# Patient Record
Sex: MALE | Race: WHITE | NOT HISPANIC OR LATINO | Employment: OTHER | URBAN - METROPOLITAN AREA
[De-identification: names, ages, dates, MRNs, and addresses within clinical notes are randomized per-mention and may not be internally consistent; named-entity substitution may affect disease eponyms.]

---

## 2022-07-15 ENCOUNTER — TELEPHONE (OUTPATIENT)
Dept: RADIATION ONCOLOGY | Facility: HOSPITAL | Age: 87
End: 2022-07-15

## 2022-07-18 ENCOUNTER — OFFICE VISIT (OUTPATIENT)
Dept: RADIATION ONCOLOGY | Facility: HOSPITAL | Age: 87
End: 2022-07-18

## 2022-07-18 ENCOUNTER — HOSPITAL ENCOUNTER (OUTPATIENT)
Dept: RADIATION ONCOLOGY | Facility: HOSPITAL | Age: 87
Setting detail: RADIATION/ONCOLOGY SERIES
Discharge: HOME | End: 2022-07-18

## 2022-07-18 VITALS
TEMPERATURE: 98.3 F | DIASTOLIC BLOOD PRESSURE: 74 MMHG | OXYGEN SATURATION: 98 % | RESPIRATION RATE: 16 BRPM | BODY MASS INDEX: 27.62 KG/M2 | HEIGHT: 71 IN | WEIGHT: 197.3 LBS | HEART RATE: 50 BPM | SYSTOLIC BLOOD PRESSURE: 175 MMHG

## 2022-07-18 DIAGNOSIS — C61 PROSTATE CANCER: Primary | ICD-10-CM

## 2022-07-18 PROCEDURE — G0463 HOSPITAL OUTPT CLINIC VISIT: HCPCS

## 2022-07-18 RX ORDER — BIMATOPROST 0.01 %
DROPS OPHTHALMIC (EYE)
COMMUNITY
Start: 2022-07-18 | End: 2022-08-26 | Stop reason: SDUPTHER

## 2022-07-18 RX ORDER — TAMSULOSIN HYDROCHLORIDE 0.4 MG/1
CAPSULE ORAL
COMMUNITY

## 2022-07-18 RX ORDER — BRIMONIDINE TARTRATE 0.15 %
DROPS OPHTHALMIC (EYE)
COMMUNITY
Start: 2022-07-18 | End: 2022-08-26 | Stop reason: SDUPTHER

## 2022-07-18 RX ORDER — NAPROXEN SODIUM 220 MG
220 TABLET ORAL 2 TIMES DAILY PRN
COMMUNITY

## 2022-07-18 RX ORDER — FERROUS SULFATE TAB EC 324 MG (65 MG FE EQUIVALENT) 324 (65 FE) MG
324 TABLET DELAYED RESPONSE ORAL
COMMUNITY

## 2022-07-18 RX ORDER — DORZOLAMIDE HYDROCHLORIDE AND TIMOLOL MALEATE 20; 5 MG/ML; MG/ML
SOLUTION/ DROPS OPHTHALMIC
COMMUNITY
End: 2022-07-18 | Stop reason: SDUPTHER

## 2022-07-18 RX ORDER — DUTASTERIDE 0.5 MG/1
CAPSULE, LIQUID FILLED ORAL
COMMUNITY

## 2022-07-18 RX ORDER — CYCLOSPORINE 0.5 MG/ML
EMULSION OPHTHALMIC
COMMUNITY
Start: 2022-04-19

## 2022-07-18 RX ORDER — MELOXICAM 7.5 MG/1
TABLET ORAL
COMMUNITY

## 2022-07-18 RX ORDER — HYDROCHLOROTHIAZIDE 25 MG/1
TABLET ORAL
COMMUNITY

## 2022-07-18 RX ORDER — LEVOTHYROXINE SODIUM 0.15 MG/1
TABLET ORAL
COMMUNITY
End: 2022-11-09

## 2022-07-18 RX ORDER — OMEPRAZOLE 20 MG/1
20 CAPSULE, DELAYED RELEASE ORAL DAILY
COMMUNITY

## 2022-07-18 RX ORDER — TRIAMCINOLONE ACETONIDE 1 MG/G
CREAM TOPICAL
COMMUNITY

## 2022-07-18 RX ORDER — DIPHENOXYLATE HYDROCHLORIDE AND ATROPINE SULFATE 2.5; .025 MG/1; MG/1
TABLET ORAL
COMMUNITY

## 2022-07-18 NOTE — PROGRESS NOTES
CONSULTATION NOTE      :                                                          1933  DATE OF CONSULTATION:                       2022   REQUESTING PHYSICIAN:                   Archie Rob Jr*  REASON FOR CONSULTATION:           Prostate cancer (HCC)  - Stage IIIA (cT1c, cN0, cM0, PSA: 21.1, Grade Group: 4)       BRIEF HISTORY:  The patient is a very pleasant 89 y.o. male  with recently diagnosed prostate cancer.  He has had a long history of BPH and elevated PSA values.  He is on Flomax and Avodart.  He had a negative prostate biopsy in .  MRI 2020 showed BPH without suspicious mass.   PSA reached a maximum value of 21.1 ng/ml on 2022.  Repeat MRI 2022 identified a PI-RADS 5 lesion in the left mid transitional zone.  This was contained within the prostate gland.  There was no evidence of pathologic adenopathy or disease elsewhere in the pelvis.  Prostate measured 70 cc on imaging.  Biopsy 2022 revealed presence of prostatic adenocarcinoma, Karri's 4+4 = 8, 1 of 16 core samples taken from the left mid gland.  Tumor involves 20% of submitted tissue.  Nuclear medicine bone scan showed no evidence of bone metastasis.  Patient is fairly healthy and active for his age.  He initiated definitive treatment with LHRH agonist injection.  He was referred for radiotherapy.    Allergy: No Known Allergies    Social History:   Social History     Socioeconomic History   • Marital status:    Tobacco Use   • Smoking status: Former Smoker     Types: Cigarettes     Quit date: 1964     Years since quittin.5   • Smokeless tobacco: Never Used   Vaping Use   • Vaping Use: Never used   Substance and Sexual Activity   • Alcohol use: Yes     Comment: 5 per week wine or bourbon   • Drug use: Never   • Sexual activity: Defer       Past Medical History:   Past Medical History:   Diagnosis Date   • Diabetes mellitus (HCC)    • Disease of thyroid gland    • Glaucoma    • Prostate  cancer (HCC)        Family History: family history includes Cancer in his sister; Lung cancer in his mother; Pancreatitis in his mother; Prader-Willi syndrome in his sister.     Surgical History:   Past Surgical History:   Procedure Laterality Date   • CATARACT EXTRACTION     • CHOLECYSTECTOMY     • COLONOSCOPY      last one at 82   • PARATHYROID GLAND SURGERY     • PROSTATE BIOPSY     • THYROID SURGERY          Review of Systems:   Review of Systems   Musculoskeletal: Positive for gait problem.   Neurological: Positive for gait problem.             IPSS Questionnaire (AUA-7):  Over the past month…    1)  Incomplete Emptying  How often have you had a sensation of not emptying your bladder?  1 - Less than 1 time in 5   2)  Frequency  How often have you had to urinate less than every two hours? 1 - Less than 1 time in 5   3)  Intermittency  How often have you found you stopped and started again several times when you urinated?  2 - Less than half the time   4) Urgency  How often have you found it difficult to postpone urination?  0 - Not at all   5) Weak Stream  How often have you had a weak urinary stream?  3 - About half the time   6) Straining  How often have you had to push or strain to begin urination?  1 - Less than 1 time in 5   7) Nocturia  How many times did you typically get up at night to urinate?  2 - 2 times   Total Score:  10       Quality of life due to urinary symptoms:  If you were to spend the rest of your life with your urinary condition the way it is now, how would you feel about that? 1-Pleased   Urine Leakage (Incontinence) 0-No Leakage     Sexual Health Inventory  Current Status    1)  How do you rate your confidence that you could achieve and keep an erection? 1-Very Low   2) When you had erections with sexual stimulation, how often were your erections hard enough for penetration (entering your partner)? 0-No sexual activity   3)  During sexual intercourse, how often were you able to maintain  "your erection after you had penetrated (entered) into your partner? 0-Did not attempt intercourse   4) During sexual intercourse, how difficult was it to maintain your erection to completion of intercourse? 0-Did not attempt intercourse   5) When you attempted sexual intercourse, how often was it satisfactory to you? 0-No sexual activity   Total Score: 1       Bowel Health Inventory  Current Status: 0-No problems, no rectal bleeding, no discharge, less then 5 bowel movements a day                 Objective   VITAL SIGNS:   Vitals:    07/18/22 1310   BP: 175/74   Pulse: 50   Resp: 16   Temp: 98.3 °F (36.8 °C)   SpO2: 98%  Comment: RA   Weight: 89.5 kg (197 lb 4.8 oz)   Height: 180.3 cm (71\")   PainSc: 0-No pain        Karnofsky score: 80   KSP %:  80    Physical Exam:   Physical Exam  Vitals and nursing note reviewed.   Constitutional:       Appearance: He is well-developed.   HENT:      Head: Normocephalic and atraumatic.   Cardiovascular:      Rate and Rhythm: Normal rate and regular rhythm.      Heart sounds: Normal heart sounds. No murmur heard.  Pulmonary:      Effort: Pulmonary effort is normal.      Breath sounds: Normal breath sounds. No wheezing or rales.   Chest:   Breasts:      Right: No supraclavicular adenopathy.      Left: No supraclavicular adenopathy.       Abdominal:      General: Bowel sounds are normal. There is no distension.      Palpations: Abdomen is soft.      Tenderness: There is no abdominal tenderness.   Genitourinary:     Prostate: Enlarged ( 60 to 80 cc, symmetrical infarct, smooth area of induration left mid gland without palpable raised nodularity.  Seminal vesicles are nonpalpable.). Not tender and no nodules present.      Rectum: No mass, tenderness, external hemorrhoid or internal hemorrhoid. Normal anal tone.   Musculoskeletal:         General: No tenderness. Normal range of motion.      Cervical back: Normal range of motion and neck supple.   Lymphadenopathy:      Cervical: No " cervical adenopathy.      Upper Body:      Right upper body: No supraclavicular adenopathy.      Left upper body: No supraclavicular adenopathy.   Skin:     General: Skin is warm and dry.   Neurological:      Mental Status: He is alert and oriented to person, place, and time.      Sensory: No sensory deficit.   Psychiatric:         Behavior: Behavior normal.         Thought Content: Thought content normal.         Judgment: Judgment normal.              The following portions of the patient's history were reviewed and updated as appropriate: allergies, current medications, past family history, past medical history, past social history, past surgical history and problem list.    Assessment:   Assessment      Prostate cancer, Karri's 4+4 = 8, clinical stage IIIA (T1c, N0, M0), PSA 21.1 ng/ml.  Clinical localized, higher grade tumor but low volume disease confined to the prostate on MRI.  He has already initiated androgen ablation.  He is tolerating that very well.  He is a candidate for definitive radiotherapy, which should give a very good chance of disease control in the pelvis.  We reviewed the use intensity modulated radiotherapy versus stereotactic body radiotherapy.  Patient would like to proceed with stereotactic body radiotherapy.  CyberKnife treatment procedure has been reviewed in detail.  Informed consent was obtained.    RECOMMENDATIONS: He will return to Dr. Rob for placement of gold seed fiducials.  He will subsequently return here for treatment planning CT and MRI pelvis.  The prostate gland will receive 5 fractions of 7 Gy, delivered on the CyberKnife on an every other day treatment schedule.    I spent a total of 55 minutes on todays visit, with more than 45 minutes in direct face to face communication, and the remainder of the time spent in reviewing the relevant history, records, available imaging, and for documentation.    Follow Up:   Return in about 4 weeks (around 8/15/2022) for Office  Visit, Simulation.  Diagnoses and all orders for this visit:    1. Prostate cancer (HCC) (Primary)         Jovan Cortez MD

## 2022-07-26 ENCOUNTER — TELEPHONE (OUTPATIENT)
Dept: RADIATION ONCOLOGY | Facility: HOSPITAL | Age: 87
End: 2022-07-26

## 2022-07-27 NOTE — TELEPHONE ENCOUNTER
Pt requested to talk to Elizabeth when she returns from her vacation about treatments in August or September but stated there was no johnson to call back. Elizabeth made aware to call Monday 8/1/22

## 2022-08-04 DIAGNOSIS — C61 PROSTATE CANCER: Primary | ICD-10-CM

## 2022-08-05 DIAGNOSIS — C61 PROSTATE CANCER: Primary | ICD-10-CM

## 2022-08-18 ENCOUNTER — DOCUMENTATION (OUTPATIENT)
Dept: NUTRITION | Facility: HOSPITAL | Age: 87
End: 2022-08-18

## 2022-08-26 ENCOUNTER — HOSPITAL ENCOUNTER (OUTPATIENT)
Dept: MRI IMAGING | Facility: HOSPITAL | Age: 87
Discharge: HOME | End: 2022-08-26

## 2022-08-26 ENCOUNTER — HOSPITAL ENCOUNTER (OUTPATIENT)
Dept: RADIATION ONCOLOGY | Facility: HOSPITAL | Age: 87
Setting detail: RADIATION/ONCOLOGY SERIES
Discharge: HOME | End: 2022-08-26

## 2022-08-26 ENCOUNTER — HOSPITAL ENCOUNTER (OUTPATIENT)
Dept: RADIATION ONCOLOGY | Facility: HOSPITAL | Age: 87
Discharge: HOME | End: 2022-08-26

## 2022-08-26 ENCOUNTER — OFFICE VISIT (OUTPATIENT)
Dept: RADIATION ONCOLOGY | Facility: HOSPITAL | Age: 87
End: 2022-08-26

## 2022-08-26 VITALS
BODY MASS INDEX: 26.88 KG/M2 | OXYGEN SATURATION: 96 % | WEIGHT: 192 LBS | HEART RATE: 64 BPM | RESPIRATION RATE: 16 BRPM | TEMPERATURE: 97.4 F | HEIGHT: 71 IN | DIASTOLIC BLOOD PRESSURE: 71 MMHG | SYSTOLIC BLOOD PRESSURE: 163 MMHG

## 2022-08-26 DIAGNOSIS — C61 PROSTATE CANCER: ICD-10-CM

## 2022-08-26 DIAGNOSIS — C61 PROSTATE CANCER: Primary | ICD-10-CM

## 2022-08-26 PROCEDURE — G0463 HOSPITAL OUTPT CLINIC VISIT: HCPCS

## 2022-08-26 PROCEDURE — 72195 MRI PELVIS W/O DYE: CPT

## 2022-08-26 PROCEDURE — 77399 UNLISTED PX MED RADJ PHYSICS: CPT | Performed by: RADIOLOGY

## 2022-08-26 RX ORDER — BRIMONIDINE TARTRATE 0.15 %
1 DROPS OPHTHALMIC (EYE) 3 TIMES DAILY
COMMUNITY
Start: 2022-07-27

## 2022-08-26 RX ORDER — FERROUS SULFATE 324(65)MG
324 TABLET, DELAYED RELEASE (ENTERIC COATED) ORAL DAILY
COMMUNITY

## 2022-08-26 NOTE — PROGRESS NOTES
RE-EVALUATION    PATIENT:                                                      Juancho Smith Jr.  :                                                          1933  DATE:                          2022   DIAGNOSIS:     Prostate cancer (HCC)  - Stage IIIA (cT1c, cN0, cM0, PSA: 21.1, Grade Group: 4)         BRIEF HISTORY:  The patient is a very pleasant 89 y.o. male  with clinically localized, higher risk prostate cancer.  He initiated androgen ablation and is tolerating that well.  He has recently received his second LHRH agonist injection.  He decided to pursue definitive treatment with stereotactic body radiotherapy.  He tolerated placement of gold seed fiducials with minimal difficulty.  Brief transient hematuria has cleared.  Brief fatigue has also resolved.  He has had no other change in health since informed consent was obtained on 2022.  He remains a good candidate for SBRT.  He is here today for simulation.  He has been following all recommended prep and restrictions.    No Known Allergies    Review of Systems   Constitutional: Positive for fatigue.   HENT:   Positive for hearing loss.                   IPSS Questionnaire (AUA-7):  Over the past month…     1)  Incomplete Emptying  How often have you had a sensation of not emptying your bladder?  1 - Less than 1 time in 5   2)  Frequency  How often have you had to urinate less than every two hours? 1 - Less than 1 time in 5   3)  Intermittency  How often have you found you stopped and started again several times when you urinated?  2 - Less than half the time   4) Urgency  How often have you found it difficult to postpone urination?  0 - Not at all   5) Weak Stream  How often have you had a weak urinary stream?  3 - About half the time   6) Straining  How often have you had to push or strain to begin urination?  1 - Less than 1 time in 5   7) Nocturia  How many times did you typically get up at night to urinate?  2 - 2 times   Total Score:  10      "    Quality of life due to urinary symptoms:  If you were to spend the rest of your life with your urinary condition the way it is now, how would you feel about that? 1-Pleased   Urine Leakage (Incontinence) 0-No Leakage      Sexual Health Inventory  Current Status     1)  How do you rate your confidence that you could achieve and keep an erection? 1-Very Low   2) When you had erections with sexual stimulation, how often were your erections hard enough for penetration (entering your partner)? 0-No sexual activity   3)  During sexual intercourse, how often were you able to maintain your erection after you had penetrated (entered) into your partner? 0-Did not attempt intercourse   4) During sexual intercourse, how difficult was it to maintain your erection to completion of intercourse? 0-Did not attempt intercourse   5) When you attempted sexual intercourse, how often was it satisfactory to you? 0-No sexual activity   Total Score: 1         Bowel Health Inventory  Current Status: 0-No problems, no rectal bleeding, no discharge, less then 5 bowel movements a day               Objective   VITAL SIGNS:   Vitals:    08/26/22 1030   BP: 163/71   Pulse: 64   Resp: 16   Temp: 97.4 °F (36.3 °C)   SpO2: 96%  Comment: RA   Weight: 87.1 kg (192 lb)   Height: 180.3 cm (71\")   PainSc: 0-No pain                Physical Exam  Vitals and nursing note reviewed.   Constitutional:       Appearance: He is well-developed.   HENT:      Head: Normocephalic and atraumatic.   Cardiovascular:      Rate and Rhythm: Normal rate and regular rhythm.      Heart sounds: No murmur heard.  Pulmonary:      Effort: Pulmonary effort is normal.      Breath sounds: No wheezing or rales.   Abdominal:      General: There is no distension.      Palpations: Abdomen is soft.      Tenderness: There is no abdominal tenderness.   Musculoskeletal:         General: No tenderness. Normal range of motion.      Cervical back: Normal range of motion and neck supple. "   Skin:     General: Skin is warm and dry.   Neurological:      Mental Status: He is alert and oriented to person, place, and time.      Sensory: No sensory deficit.   Psychiatric:         Behavior: Behavior normal.         Thought Content: Thought content normal.         Judgment: Judgment normal.              The following portions of the patient's history were reviewed and updated as appropriate: allergies, current medications, past family history, past medical history, past social history, past surgical history and problem list.      IMPRESSION:  Prostate cancer, [place]'s 4+4 = 8, clinical stage IIIA (T1c, N0, M0), PSA 21.1 ng/ml.      RECOMMENDATIONS: He will undergo treatment planning CT and MRI pelvis today.  The prostate gland and proximal seminal vesicles will receive 5 fractions of 7 Gray each, delivered on CyberKnife, on every other day treatment schedule.       Jovan Cortez MD     Approximate 15 minutes was spent during this visit, 10 minutes with patient and wife reviewing procedure and answering questions.

## 2022-09-01 ENCOUNTER — HOSPITAL ENCOUNTER (OUTPATIENT)
Dept: RADIATION ONCOLOGY | Facility: HOSPITAL | Age: 87
Setting detail: RADIATION/ONCOLOGY SERIES
Discharge: HOME | End: 2022-09-01

## 2022-09-02 ENCOUNTER — DOCUMENTATION (OUTPATIENT)
Dept: RADIATION ONCOLOGY | Facility: HOSPITAL | Age: 87
End: 2022-09-02

## 2022-09-02 ENCOUNTER — TELEPHONE (OUTPATIENT)
Dept: RADIATION ONCOLOGY | Facility: HOSPITAL | Age: 87
End: 2022-09-02

## 2022-09-02 NOTE — TELEPHONE ENCOUNTER
Pt called to report that he had a recent fall which has made his left knee pain worse.  He states he doesn't think he can lie on the table with his knees extended and wanted to know if it would be possible to have a roll under his left knee.  I explained it would likely be 2 more weeks before his treatment starts but if his knee is still hurting we would do our best to make him comfortable and put a small roll under his knee.  He also wanted to report that he is now taking meloxicam for the knee pain.

## 2022-09-09 PROCEDURE — 77300 RADIATION THERAPY DOSE PLAN: CPT | Performed by: RADIOLOGY

## 2022-09-09 PROCEDURE — 77338 DESIGN MLC DEVICE FOR IMRT: CPT | Performed by: RADIOLOGY

## 2022-09-09 PROCEDURE — 77301 RADIOTHERAPY DOSE PLAN IMRT: CPT | Performed by: RADIOLOGY

## 2022-09-20 ENCOUNTER — HOSPITAL ENCOUNTER (OUTPATIENT)
Dept: RADIATION ONCOLOGY | Facility: HOSPITAL | Age: 87
Discharge: HOME | End: 2022-09-20

## 2022-09-20 PROCEDURE — 77373 STRTCTC BDY RAD THER TX DLVR: CPT | Performed by: RADIOLOGY

## 2022-09-23 ENCOUNTER — HOSPITAL ENCOUNTER (OUTPATIENT)
Dept: RADIATION ONCOLOGY | Facility: HOSPITAL | Age: 87
Discharge: HOME | End: 2022-09-23

## 2022-09-23 PROCEDURE — 77373 STRTCTC BDY RAD THER TX DLVR: CPT | Performed by: RADIOLOGY

## 2022-09-26 ENCOUNTER — HOSPITAL ENCOUNTER (OUTPATIENT)
Dept: RADIATION ONCOLOGY | Facility: HOSPITAL | Age: 87
Discharge: HOME | End: 2022-09-26

## 2022-09-26 PROCEDURE — 77373 STRTCTC BDY RAD THER TX DLVR: CPT | Performed by: RADIOLOGY

## 2022-09-26 PROCEDURE — 77280 THER RAD SIMULAJ FIELD SMPL: CPT | Performed by: RADIOLOGY

## 2022-09-28 ENCOUNTER — HOSPITAL ENCOUNTER (OUTPATIENT)
Dept: RADIATION ONCOLOGY | Facility: HOSPITAL | Age: 87
Discharge: HOME | End: 2022-09-28

## 2022-09-28 PROCEDURE — 77373 STRTCTC BDY RAD THER TX DLVR: CPT | Performed by: RADIOLOGY

## 2022-09-30 ENCOUNTER — HOSPITAL ENCOUNTER (OUTPATIENT)
Dept: RADIATION ONCOLOGY | Facility: HOSPITAL | Age: 87
Discharge: HOME | End: 2022-09-30

## 2022-09-30 PROCEDURE — 77373 STRTCTC BDY RAD THER TX DLVR: CPT | Performed by: RADIOLOGY

## 2022-09-30 PROCEDURE — 77336 RADIATION PHYSICS CONSULT: CPT | Performed by: RADIOLOGY

## 2022-11-09 ENCOUNTER — HOSPITAL ENCOUNTER (OUTPATIENT)
Dept: RADIATION ONCOLOGY | Facility: HOSPITAL | Age: 87
Setting detail: RADIATION/ONCOLOGY SERIES
Discharge: HOME | End: 2022-11-09

## 2022-11-09 ENCOUNTER — OFFICE VISIT (OUTPATIENT)
Dept: RADIATION ONCOLOGY | Facility: HOSPITAL | Age: 87
End: 2022-11-09

## 2022-11-09 DIAGNOSIS — C61 PROSTATE CANCER: Primary | ICD-10-CM

## 2022-11-09 RX ORDER — LEVOTHYROXINE SODIUM 137 UG/1
137 TABLET ORAL DAILY
COMMUNITY

## 2022-11-09 NOTE — PROGRESS NOTES
TELEMEDICINE FOLLOW UP NOTE    PATIENT:                                                      Juancho Smith Jr.  MEDICAL RECORD #:                        [id number]  :                                                          1933  COMPLETION DATE:   2022  DIAGNOSIS:     Prostate cancer (HCC)  - Stage IIIA (cT1c, cN0, cM0, PSA: 21.1, Grade Group: 4)    This visit has been converted to a telehealth virtual visit.  Total time of discussion was 28 minutes.  The patient has given verbal consent.      COVID-19 RISK SCREEN      1. Has the patient had close contact or exposure without PPE with a lab confirmed COVID-19 (+) person or a person under investigation (PUI) for COVID-19 infection?  -- No      2. Has the patient had respiratory symptoms, worsened/new cough and/or SOA, unexplained fever, or sudden loss of smell and/or taste in the past week? --  No     3. Has the patient completed COVID vaccination? --  Yes     BRIEF HISTORY:    Initial follow up visit for clinically localized, higher risk prostate cancer.  He has initiated neoadjuvant and concurrent androgen ablation therapy and is tolerating that well.  The patient has since undergone definitive treatment with a course of stereotactic body radiotherapy.  He tolerated treatment well.  He reports post-treatment fatigue has subsided.  He continues longstanding use of Flomax and finasteride in the setting of BPH with chronic, moderate urinary outflow obstructive symptoms.  He reports urinary frequency and urgency were mildly exacerbated and have largely returned to normal at this point.   He describes a brief duration of some discomfort without burning with initiation of stream.  This has reportedly resolved.  No hematuria or incontinence.  He experienced single episode of loose stool and no other GI symptoms.  Overall, he feels well.  He has gained a few pounds which he relates to hormone therapy.       IPSS Questionnaire (AUA-7):  Over the past month…    1)   Incomplete Emptying  How often have you had a sensation of not emptying your bladder?  1 - Less than 1 time in 5   2)  Frequency  How often have you had to urinate less than every two hours? 1 - Less than 1 time in 5   3)  Intermittency  How often have you found you stopped and started again several times when you urinated?  2 - Less than half the time   4) Urgency  How often have you found it difficult to postpone urination?  0 - Not at all   5) Weak Stream  How often have you had a weak urinary stream?  3 - About half the time   6) Straining  How often have you had to push or strain to begin urination?  1 - Less than 1 time in 5   7) Nocturia  How many times did you typically get up at night to urinate?  3 - 3 times   Total Score:  11       Quality of life due to urinary symptoms:  If you were to spend the rest of your life with your urinary condition the way it is now, how would you feel about that? 2-Mostly Satisfied   Urine Leakage (Incontinence) 0-No Leakage     Sexual Health Inventory  Current Status    1)  How do you rate your confidence that you could achieve and keep an erection? 1-Very Low   2) When you had erections with sexual stimulation, how often were your erections hard enough for penetration (entering your partner)? 0-No sexual activity   3)  During sexual intercourse, how often were you able to maintain your erection after you had penetrated (entered) into your partner? 0-Did not attempt intercourse   4) During sexual intercourse, how difficult was it to maintain your erection to completion of intercourse? 0-Did not attempt intercourse   5) When you attempted sexual intercourse, how often was it satisfactory to you? 0-No sexual activity   Total Score: 1       Bowel Health Inventory  Current Status: 0-No problems, no rectal bleeding, no discharge, less then 5 bowel movements a day         MEDICATIONS: Medication reconciliation for the patient was reviewed and confirmed in the electronic medical  record.    Review of Systems   Constitutional: Positive for fatigue.   HENT:   Positive for hearing loss.    All other systems reviewed and are negative.          KPS 80%      Physical Exam  Pulmonary:      Respirations even, unlabored. No audible wheezing or cough.  Neurological:      A+Ox4, conversant, answers questions appropriately.  Psychiatric:     Judgement, affect, and decision-making WNL.    Limited physical exam as visit was conducted remotely via telephone in light of the COVID-19 pandemic.      The following portions of the patient's history were reviewed and updated as appropriate: allergies, current medications, past family history, past medical history, past social history, past surgical history and problem list.         Diagnoses and all orders for this visit:    1. Prostate cancer (HCC) (Primary)         IMPRESSION:  Prostate cancer, Karri's 4+4 = 8, clinical stage IIIA (T1c, N0, M0), PSA 21.1 ng/ml.  Clinically localized, higher grade tumor but low volume disease confined to the prostate on MRI.   He initiated androgen ablation with next 6-month LHRH agonist injection scheduled February 2023.  He is now 1 month status post CyberKnife SBRT.  He tolerated treatment well.   He developed the anticipated grade 1 fatigue and urinary side effects which appropriately have subsided.  He did not develop significant acute radiation related toxicities otherwise.  He will continue longstanding use of flomax and finasteride at least for now, in the setting of chronic BPH symptoms and grossly enlarged prostate.  We discussed that we would expect clinical downsizing of the gland related to hormone therapy and SBRT.    Mr. Smith and I have reviewed the survivorship care plan in detail.  We discussed diagnosis, follow-up intervals, biannual PSA monitoring, and expectations for response to treatment.  A copy of the care plan has been mailed to the patient.  A copy has also been sent to his PCP.           RECOMMENDATIONS:  Mr. Smith continues routine urologic surveillance under the direction of Dr. Rob, including management of ADT and biannual PSA monitoring.     Return in about 6 months (around 5/9/2023) for Office Visit.    ALBIN Ledesma    I spent a total of 55 minutes on today's visit, with more than 28 minutes in virtual communication with the patient via telephone, and the remainder of the time spent in reviewing the relevant history, records, available imaging, and for documentation.

## 2023-05-10 ENCOUNTER — OFFICE VISIT (OUTPATIENT)
Dept: RADIATION ONCOLOGY | Facility: HOSPITAL | Age: 88
End: 2023-05-10
Payer: MEDICARE

## 2023-05-10 ENCOUNTER — HOSPITAL ENCOUNTER (OUTPATIENT)
Dept: RADIATION ONCOLOGY | Facility: HOSPITAL | Age: 88
Setting detail: RADIATION/ONCOLOGY SERIES
Discharge: HOME | End: 2023-05-10
Payer: MEDICARE

## 2023-05-10 VITALS
RESPIRATION RATE: 16 BRPM | OXYGEN SATURATION: 95 % | BODY MASS INDEX: 28.17 KG/M2 | SYSTOLIC BLOOD PRESSURE: 148 MMHG | WEIGHT: 202 LBS | DIASTOLIC BLOOD PRESSURE: 66 MMHG | HEART RATE: 60 BPM | TEMPERATURE: 97.2 F

## 2023-05-10 DIAGNOSIS — C61 PROSTATE CANCER: Primary | ICD-10-CM

## 2023-05-10 PROCEDURE — G0463 HOSPITAL OUTPT CLINIC VISIT: HCPCS

## 2023-05-10 NOTE — PROGRESS NOTES
FOLLOW UP NOTE    PATIENT:                                                      Juancho Smith Jr.  MEDICAL RECORD #:                        [id number]  :                                                          1933  COMPLETION DATE:   2022  DIAGNOSIS:     Prostate cancer  - Stage IIIA (cT1c, cN0, cM0, PSA: 21.1, Grade Group: 4)      BRIEF HISTORY:    Routine follow up visit for clinically localized, higher risk prostate cancer.  He initiated neoadjuvant and concurrent androgen ablation therapy with LHRH agonist injection in 2022.  The patient then underwent definitive treatment with a course of CyberKnife stereotactic body radiotherapy, completing in 2023.  He tolerated treatment well.  He continues longstanding use of Flomax and dutasteride in the setting of BPH related urinary outflow obstructive symptoms.  He reports urinary voiding is stable and unchanged from prior.  He denies hematuria, dysuria, or incontinence.  He reports regular bowel movements and denies acute GI complaints.  Energy level is stable.  He remains active and independent with ADLs.  He suffered a recent right wrist fracture secondary to tripping and falling over tree roots.  He otherwise denies new or progressive bone pains.  He reports good tolerance of hormonal therapy.  Last Eligard injection was administered in 2023.  Post-treatment PSA continues to decline, with value of 0.09 ng/ml on 11/10/2023 and 0.06 ng/ml on 2023.        IPSS Questionnaire (AUA-7):  Over the past month…    1)  Incomplete Emptying  How often have you had a sensation of not emptying your bladder?  2 - Less than half the time   2)  Frequency  How often have you had to urinate less than every two hours? 1 - Less than 1 time in 5   3)  Intermittency  How often have you found you stopped and started again several times when you urinated?  0 - Not at all   4) Urgency  How often have you found it difficult to postpone urination?  1 - Less than 1 time in  5   5) Weak Stream  How often have you had a weak urinary stream?  1 - Less than 1 time in 5   6) Straining  How often have you had to push or strain to begin urination?  0 - Not at all   7) Nocturia  How many times did you typically get up at night to urinate?  2 - 2 times   Total Score:  7       Quality of life due to urinary symptoms:  If you were to spend the rest of your life with your urinary condition the way it is now, how would you feel about that? 1-Pleased   Urine Leakage (Incontinence) 0-No Leakage     Sexual Health Inventory  Current Status    1)  How do you rate your confidence that you could achieve and keep an erection? 1-Very Low   2) When you had erections with sexual stimulation, how often were your erections hard enough for penetration (entering your partner)? 1-Almost never or never   3)  During sexual intercourse, how often were you able to maintain your erection after you had penetrated (entered) into your partner? 1-Almost never or never   4) During sexual intercourse, how difficult was it to maintain your erection to completion of intercourse? 1-Extremely difficult   5) When you attempted sexual intercourse, how often was it satisfactory to you? 1-Almost never or never   Total Score: 5       Bowel Health Inventory  Current Status: 0-No problems, no rectal bleeding, no discharge, less then 5 bowel movements a day         MEDICATIONS: Medication reconciliation for the patient was reviewed and confirmed in the electronic medical record.    Review of Systems   HENT:   Positive for hearing loss.    Genitourinary: Positive for nocturia.    All other systems reviewed and are negative.          KPS 80%      Physical Exam  Vitals and nursing note reviewed.   Constitutional:       General: He is not in acute distress.     Appearance: Normal appearance. He is well-developed.   HENT:      Head: Normocephalic and atraumatic.   Eyes:      Conjunctiva/sclera: Conjunctivae normal.      Pupils: Pupils are  equal, round, and reactive to light.   Cardiovascular:      Rate and Rhythm: Normal rate and regular rhythm.   Pulmonary:      Effort: Pulmonary effort is normal.      Breath sounds: Normal breath sounds.   Genitourinary:     Prostate: Enlarged (30-40 cc (previously 60-80 cc), flat, smooth, no nodules or induration.  Seminal vesicles not palpable.). Not tender and no nodules present.      Rectum: No mass, external hemorrhoid or internal hemorrhoid. Normal anal tone.   Musculoskeletal:         General: Signs of injury (right wrist in hard cast 2/2 fracture) present. Normal range of motion.      Cervical back: Normal range of motion and neck supple.   Lymphadenopathy:      Cervical: No cervical adenopathy.   Skin:     General: Skin is warm and dry.   Neurological:      Mental Status: He is alert and oriented to person, place, and time.   Psychiatric:         Behavior: Behavior normal.         Thought Content: Thought content normal.         Judgment: Judgment normal.         VITAL SIGNS:   Vitals:    05/10/23 1611   BP: 148/66   Pulse: 60   Resp: 16   Temp: 97.2 °F (36.2 °C)   TempSrc: Temporal   SpO2: 95%   Weight: 91.6 kg (202 lb)                 The following portions of the patient's history were reviewed and updated as appropriate: allergies, current medications, past family history, past medical history, past social history, past surgical history and problem list.         Diagnoses and all orders for this visit:    1. Prostate cancer (Primary)         IMPRESSION:  Prostate cancer, Karri's 4+4 = 8, clinical stage IIIA (T1c, N0, M0), PSA 21.1 ng/ml.  Clinically localized, higher grade tumor but low volume disease confined to the prostate on MRI.   7 months status post CyberKnife SBRT which he received concurrently with androgen ablation.  He continues adjuvant androgen ablation planned for a total duration of 2 years.    The patient tolerated treatment well.  He has had excellent clinical and biochemical  response to treatment with downsizing of his grossly enlarged prostate, and PSA reduction down to a meaghan value of 0.06 ng/ml.  Prognosis should remain good at this point.  He will continue longstanding use of flomax and finasteride at least for now, but we did discuss potential to taper/discontinue one or both medications as tolerated.  We again reviewed follow up intervals, serial PSA monitoring, and continued expectations for response to treatment.      RECOMMENDATIONS:  Mr. Smith continues routine urologic surveillance under the care of Dr. Rob, with follow up, repeat PSA, and next LHRH agonist injection scheduled 8/21/2023.  We will schedule the patient to return for 1 additional follow up in 1 year, or certainly sooner in the event that PSA rises above 2 ng/ml.        Return in about 1 year (around 5/10/2024) for Office Visit.    ALBIN Ledesma spent a total of 50 minutes on today's visit, with more than 30 minutes in direct face to face communication, and the remainder of the time spent in reviewing the relevant history, records, available imaging, and for documentation.

## 2024-05-15 ENCOUNTER — OFFICE VISIT (OUTPATIENT)
Dept: RADIATION ONCOLOGY | Facility: HOSPITAL | Age: 89
End: 2024-05-15
Payer: MEDICARE

## 2024-05-15 ENCOUNTER — HOSPITAL ENCOUNTER (OUTPATIENT)
Dept: RADIATION ONCOLOGY | Facility: HOSPITAL | Age: 89
Setting detail: RADIATION/ONCOLOGY SERIES
End: 2024-05-15
Payer: MEDICARE

## 2024-05-15 VITALS
HEART RATE: 79 BPM | RESPIRATION RATE: 20 BRPM | DIASTOLIC BLOOD PRESSURE: 75 MMHG | SYSTOLIC BLOOD PRESSURE: 170 MMHG | TEMPERATURE: 97.9 F | OXYGEN SATURATION: 98 % | BODY MASS INDEX: 27.6 KG/M2 | WEIGHT: 197.9 LBS

## 2024-05-15 DIAGNOSIS — C61 PROSTATE CANCER: Primary | ICD-10-CM

## 2024-05-15 PROCEDURE — G0463 HOSPITAL OUTPT CLINIC VISIT: HCPCS

## 2024-05-15 RX ORDER — DORZOLAMIDE HYDROCHLORIDE AND TIMOLOL MALEATE 20; 5 MG/ML; MG/ML
SOLUTION/ DROPS OPHTHALMIC
COMMUNITY

## 2024-05-15 RX ORDER — DUTASTERIDE 0.5 MG/1
1 CAPSULE, LIQUID FILLED ORAL DAILY
COMMUNITY

## 2024-05-15 RX ORDER — FLUTICASONE PROPIONATE 50 MCG
2 SPRAY, SUSPENSION (ML) NASAL DAILY
COMMUNITY

## 2024-05-15 RX ORDER — BIMATOPROST 0.1 MG/ML
1 SOLUTION/ DROPS OPHTHALMIC
COMMUNITY
Start: 2024-05-11

## 2024-05-15 RX ORDER — BRIMONIDINE TARTRATE 2 MG/ML
SOLUTION/ DROPS OPHTHALMIC
COMMUNITY
Start: 2024-01-17

## 2024-05-15 RX ORDER — TAMSULOSIN HYDROCHLORIDE 0.4 MG/1
1 CAPSULE ORAL DAILY
COMMUNITY

## 2024-05-15 RX ORDER — LEVOTHYROXINE SODIUM 137 UG/1
1 TABLET ORAL DAILY
COMMUNITY

## 2024-05-15 RX ORDER — HYDROCHLOROTHIAZIDE 25 MG/1
1 TABLET ORAL DAILY
COMMUNITY

## 2024-05-15 NOTE — PROGRESS NOTES
FOLLOW UP NOTE    PATIENT:                                                      Juancho Smith Jr.  MEDICAL RECORD #:                        [id number]  :                                                          1933  COMPLETION DATE:   2022  DIAGNOSIS:     Prostate cancer  - Stage IIIA (cT1c, cN0, cM0, PSA: 21.1, Grade Group: 4)      BRIEF HISTORY:    Routine follow up visit for clinically localized, higher risk prostate cancer.  He initiated neoadjuvant and concurrent androgen ablation therapy with LHRH agonist injection in 2022.  The patient then underwent definitive treatment with a course of CyberKnife stereotactic body radiotherapy, completing in 2023.  He tolerated treatment well.  He continues longstanding use of Flomax and dutasteride in the setting of BPH related urinary outflow obstructive symptoms.  He reports urinary voiding is stable and unchanged from prior.  He denies hematuria, dysuria, or incontinence.  He reports regular bowel movements and denies acute GI complaints.  Energy level is stable.  He remains active and independent with ADLs.  He has degenerative joint pains particularly in the left knee but denies new or progressive bone pains.  Last Eligard injection was administered in 2023.  Post-treatment PSA continues to decline, with value of 0.09 ng/ml on 11/10/2023 and 0.06 ng/ml on 2023 and less than 0.04 ng/ml on 3/14/2024.    MEDICATIONS: Medication reconciliation for the patient was reviewed and confirmed in the electronic medical record.    Review of Systems   HENT:   Positive for hearing loss.    Musculoskeletal:  Positive for arthralgias and gait problem.   Neurological:  Positive for gait problem.       IPSS Questionnaire (AUA-7):  Over the past month…    1)  Incomplete Emptying  How often have you had a sensation of not emptying your bladder?  1 - Less than 1 time in 5   2)  Frequency  How often have you had to urinate less than every two hours? 1 - Less than 1  time in 5   3)  Intermittency  How often have you found you stopped and started again several times when you urinated?  2 - Less than half the time   4) Urgency  How often have you found it difficult to postpone urination?  1 - Less than 1 time in 5   5) Weak Stream  How often have you had a weak urinary stream?  0 - Not at all   6) Straining  How often have you had to push or strain to begin urination?  0 - Not at all   7) Nocturia  How many times did you typically get up at night to urinate?  4 - 4 times   Total Score:  9       Quality of life due to urinary symptoms:  If you were to spend the rest of your life with your urinary condition the way it is now, how would you feel about that? 1-Pleased   Urine Leakage (Incontinence) 0-No Leakage     Sexual Health Inventory  Current Status    1)  How do you rate your confidence that you could achieve and keep an erection? 1-Very Low   2) When you had erections with sexual stimulation, how often were your erections hard enough for penetration (entering your partner)? 0-No sexual activity   3)  During sexual intercourse, how often were you able to maintain your erection after you had penetrated (entered) into your partner? 0-Did not attempt intercourse   4) During sexual intercourse, how difficult was it to maintain your erection to completion of intercourse? 0-Did not attempt intercourse   5) When you attempted sexual intercourse, how often was it satisfactory to you? 0-No sexual activity   Total Score: 1       Bowel Health Inventory  Current Status: 0-No problems, no rectal bleeding, no discharge, less then 5 bowel movements a day              Karnofsky score: 80     Physical Exam  Vitals and nursing note reviewed.   Constitutional:       Appearance: He is well-developed.   HENT:      Head: Normocephalic and atraumatic.   Cardiovascular:      Rate and Rhythm: Normal rate and regular rhythm.      Heart sounds: Normal heart sounds. No murmur heard.  Pulmonary:       Effort: Pulmonary effort is normal.      Breath sounds: Normal breath sounds. No wheezing or rales.   Abdominal:      General: Bowel sounds are normal. There is no distension.      Palpations: Abdomen is soft.      Tenderness: There is no abdominal tenderness.   Genitourinary:     Prostate: Not enlarged (20 cc, flat, smooth, no nodules or induration.), not tender and no nodules present.      Rectum: No mass, tenderness, anal fissure, external hemorrhoid or internal hemorrhoid. Normal anal tone.   Musculoskeletal:         General: Tenderness (Left knee) present. Normal range of motion.      Cervical back: Normal range of motion and neck supple.      Right lower leg: Edema present.      Left lower leg: Edema present.   Lymphadenopathy:      Cervical: No cervical adenopathy.      Upper Body:      Right upper body: No supraclavicular adenopathy.      Left upper body: No supraclavicular adenopathy.   Skin:     General: Skin is warm and dry.   Neurological:      Mental Status: He is alert and oriented to person, place, and time.      Sensory: No sensory deficit.   Psychiatric:         Behavior: Behavior normal.         Thought Content: Thought content normal.         Judgment: Judgment normal.         VITAL SIGNS:   Vitals:    05/15/24 1503   BP: 170/75   Pulse: 79   Resp: 20   Temp: 97.9 °F (36.6 °C)   TempSrc: Skin   SpO2: 98%   Weight: 89.8 kg (197 lb 14.4 oz)   PainSc: 0-No pain             Karnofsky score: 80         The following portions of the patient's history were reviewed and updated as appropriate: allergies, current medications, past family history, past medical history, past social history, past surgical history and problem list.         Diagnoses and all orders for this visit:    1. Prostate cancer (Primary)         IMPRESSION:  Prostate cancer, Mauldin's 4+4 = 8, clinical stage IIIA (T1c, N0, M0), PSA 21.1 ng/ml.  Clinically localized, higher grade tumor but low volume disease confined to the prostate on  MRI.   1 and half years status post CyberKnife SBRT which he received concurrently with androgen ablation.  PSA is undetectable and he is clinically doing very well.    RECOMMENDATIONS: He plans to continue urology surveillance under the care of Dr. Rob.  I will be happy to be available, if needed in the future.  He will notify me if he develops any potentially radiation related symptoms or if his PSA ever rises above a value of 2 ng/ml.    I spent a total of 25 minutes on todays visit, with more than 20 minutes in direct face to face communication, and the remainder of the time spent in reviewing the relevant history, records, available imaging, and for documentation.    Return if symptoms worsen or fail to improve.    Jovan Coretz MD

## 2025-02-02 ENCOUNTER — HOSPITAL ENCOUNTER (OUTPATIENT)
Facility: HOSPITAL | Age: OVER 89
Setting detail: OBSERVATION
Discharge: HOME | End: 2025-02-04
Attending: EMERGENCY MEDICINE | Admitting: INTERNAL MEDICINE
Payer: MEDICARE

## 2025-02-02 ENCOUNTER — APPOINTMENT (OUTPATIENT)
Dept: GENERAL RADIOLOGY | Facility: HOSPITAL | Age: OVER 89
End: 2025-02-02
Payer: MEDICARE

## 2025-02-02 ENCOUNTER — APPOINTMENT (OUTPATIENT)
Dept: CT IMAGING | Facility: HOSPITAL | Age: OVER 89
End: 2025-02-02
Payer: MEDICARE

## 2025-02-02 DIAGNOSIS — G45.9 TIA (TRANSIENT ISCHEMIC ATTACK): ICD-10-CM

## 2025-02-02 DIAGNOSIS — R20.0 LEFT ARM NUMBNESS: Primary | ICD-10-CM

## 2025-02-02 DIAGNOSIS — R20.0 NUMBNESS AND TINGLING OF LEFT SIDE OF FACE: ICD-10-CM

## 2025-02-02 DIAGNOSIS — G45.9 TRANSIENT ISCHEMIC ATTACK (TIA): ICD-10-CM

## 2025-02-02 DIAGNOSIS — R20.2 NUMBNESS AND TINGLING OF LEFT SIDE OF FACE: ICD-10-CM

## 2025-02-02 LAB
ALBUMIN SERPL-MCNC: 4.2 G/DL (ref 3.5–5.2)
ALBUMIN/GLOB SERPL: 1.6 G/DL
ALP SERPL-CCNC: 78 U/L (ref 39–117)
ALT SERPL W P-5'-P-CCNC: 21 U/L (ref 1–41)
ANION GAP SERPL CALCULATED.3IONS-SCNC: 11 MMOL/L (ref 5–15)
APTT PPP: 33.2 SECONDS (ref 22–39)
AST SERPL-CCNC: 23 U/L (ref 1–40)
BASOPHILS # BLD AUTO: 0.05 10*3/MM3 (ref 0–0.2)
BASOPHILS NFR BLD AUTO: 0.8 % (ref 0–1.5)
BILIRUB SERPL-MCNC: 0.2 MG/DL (ref 0–1.2)
BUN SERPL-MCNC: 31 MG/DL (ref 8–23)
BUN/CREAT SERPL: 31 (ref 7–25)
CALCIUM SPEC-SCNC: 10.2 MG/DL (ref 8.2–9.6)
CHLORIDE SERPL-SCNC: 104 MMOL/L (ref 98–107)
CO2 SERPL-SCNC: 29 MMOL/L (ref 22–29)
CREAT SERPL-MCNC: 1 MG/DL (ref 0.76–1.27)
DEPRECATED RDW RBC AUTO: 41.4 FL (ref 37–54)
EGFRCR SERPLBLD CKD-EPI 2021: 71.1 ML/MIN/1.73
EOSINOPHIL # BLD AUTO: 0.21 10*3/MM3 (ref 0–0.4)
EOSINOPHIL NFR BLD AUTO: 3.3 % (ref 0.3–6.2)
ERYTHROCYTE [DISTWIDTH] IN BLOOD BY AUTOMATED COUNT: 12.2 % (ref 12.3–15.4)
GLOBULIN UR ELPH-MCNC: 2.7 GM/DL
GLUCOSE SERPL-MCNC: 117 MG/DL (ref 65–99)
HCT VFR BLD AUTO: 39.9 % (ref 37.5–51)
HGB BLD-MCNC: 13.4 G/DL (ref 13–17.7)
HOLD SPECIMEN: NORMAL
IMM GRANULOCYTES # BLD AUTO: 0.03 10*3/MM3 (ref 0–0.05)
IMM GRANULOCYTES NFR BLD AUTO: 0.5 % (ref 0–0.5)
INR PPP: 0.95 (ref 0.89–1.12)
LYMPHOCYTES # BLD AUTO: 1.21 10*3/MM3 (ref 0.7–3.1)
LYMPHOCYTES NFR BLD AUTO: 19.2 % (ref 19.6–45.3)
MCH RBC QN AUTO: 31.2 PG (ref 26.6–33)
MCHC RBC AUTO-ENTMCNC: 33.6 G/DL (ref 31.5–35.7)
MCV RBC AUTO: 93 FL (ref 79–97)
MONOCYTES # BLD AUTO: 0.66 10*3/MM3 (ref 0.1–0.9)
MONOCYTES NFR BLD AUTO: 10.5 % (ref 5–12)
NEUTROPHILS NFR BLD AUTO: 4.13 10*3/MM3 (ref 1.7–7)
NEUTROPHILS NFR BLD AUTO: 65.7 % (ref 42.7–76)
NRBC BLD AUTO-RTO: 0 /100 WBC (ref 0–0.2)
PLATELET # BLD AUTO: 274 10*3/MM3 (ref 140–450)
PMV BLD AUTO: 8.9 FL (ref 6–12)
POTASSIUM SERPL-SCNC: 4.1 MMOL/L (ref 3.5–5.2)
PROT SERPL-MCNC: 6.9 G/DL (ref 6–8.5)
PROTHROMBIN TIME: 12.7 SECONDS (ref 12.2–14.5)
RBC # BLD AUTO: 4.29 10*6/MM3 (ref 4.14–5.8)
SODIUM SERPL-SCNC: 144 MMOL/L (ref 136–145)
WBC NRBC COR # BLD AUTO: 6.29 10*3/MM3 (ref 3.4–10.8)
WHOLE BLOOD HOLD COAG: NORMAL
WHOLE BLOOD HOLD SPECIMEN: NORMAL

## 2025-02-02 PROCEDURE — G0378 HOSPITAL OBSERVATION PER HR: HCPCS

## 2025-02-02 PROCEDURE — 85730 THROMBOPLASTIN TIME PARTIAL: CPT | Performed by: EMERGENCY MEDICINE

## 2025-02-02 PROCEDURE — 70496 CT ANGIOGRAPHY HEAD: CPT

## 2025-02-02 PROCEDURE — 0042T HC CT CEREBRAL PERFUSION W/WO CONTRAST: CPT

## 2025-02-02 PROCEDURE — 80053 COMPREHEN METABOLIC PANEL: CPT | Performed by: EMERGENCY MEDICINE

## 2025-02-02 PROCEDURE — 85610 PROTHROMBIN TIME: CPT | Performed by: EMERGENCY MEDICINE

## 2025-02-02 PROCEDURE — 93005 ELECTROCARDIOGRAM TRACING: CPT | Performed by: EMERGENCY MEDICINE

## 2025-02-02 PROCEDURE — 99204 OFFICE O/P NEW MOD 45 MIN: CPT

## 2025-02-02 PROCEDURE — 99285 EMERGENCY DEPT VISIT HI MDM: CPT

## 2025-02-02 PROCEDURE — 25510000001 IOPAMIDOL PER 1 ML: Performed by: EMERGENCY MEDICINE

## 2025-02-02 PROCEDURE — 70498 CT ANGIOGRAPHY NECK: CPT

## 2025-02-02 PROCEDURE — 71045 X-RAY EXAM CHEST 1 VIEW: CPT

## 2025-02-02 PROCEDURE — 85025 COMPLETE CBC W/AUTO DIFF WBC: CPT | Performed by: EMERGENCY MEDICINE

## 2025-02-02 PROCEDURE — 70450 CT HEAD/BRAIN W/O DYE: CPT

## 2025-02-02 RX ORDER — SODIUM CHLORIDE 0.9 % (FLUSH) 0.9 %
10 SYRINGE (ML) INJECTION AS NEEDED
Status: DISCONTINUED | OUTPATIENT
Start: 2025-02-02 | End: 2025-02-04 | Stop reason: HOSPADM

## 2025-02-02 RX ORDER — IOPAMIDOL 755 MG/ML
120 INJECTION, SOLUTION INTRAVASCULAR
Status: COMPLETED | OUTPATIENT
Start: 2025-02-03 | End: 2025-02-02

## 2025-02-02 RX ADMIN — IOPAMIDOL 120 ML: 755 INJECTION, SOLUTION INTRAVENOUS at 23:53

## 2025-02-03 ENCOUNTER — APPOINTMENT (OUTPATIENT)
Dept: MRI IMAGING | Facility: HOSPITAL | Age: OVER 89
End: 2025-02-03
Payer: MEDICARE

## 2025-02-03 ENCOUNTER — APPOINTMENT (OUTPATIENT)
Dept: CARDIOLOGY | Facility: HOSPITAL | Age: OVER 89
End: 2025-02-03
Payer: MEDICARE

## 2025-02-03 PROBLEM — G45.9 TIA (TRANSIENT ISCHEMIC ATTACK): Status: ACTIVE | Noted: 2025-02-03

## 2025-02-03 LAB
ANION GAP SERPL CALCULATED.3IONS-SCNC: 9 MMOL/L (ref 5–15)
AV MEAN PRESS GRAD SYS DOP V1V2: 6 MMHG
AV VMAX SYS DOP: 171 CM/SEC
BH CV ECHO MEAS - AI P1/2T: 459.6 MSEC
BH CV ECHO MEAS - AO MAX PG: 11.7 MMHG
BH CV ECHO MEAS - AO ROOT DIAM: 3.7 CM
BH CV ECHO MEAS - AO V2 VTI: 38.3 CM
BH CV ECHO MEAS - AVA(I,D): 1.46 CM2
BH CV ECHO MEAS - EDV(CUBED): 97.3 ML
BH CV ECHO MEAS - EDV(MOD-SP2): 95.7 ML
BH CV ECHO MEAS - EDV(MOD-SP4): 115 ML
BH CV ECHO MEAS - EF(MOD-SP2): 51.5 %
BH CV ECHO MEAS - EF(MOD-SP4): 57.4 %
BH CV ECHO MEAS - ESV(CUBED): 17.6 ML
BH CV ECHO MEAS - ESV(MOD-SP2): 46.4 ML
BH CV ECHO MEAS - ESV(MOD-SP4): 49 ML
BH CV ECHO MEAS - FS: 43.5 %
BH CV ECHO MEAS - IVS/LVPW: 1 CM
BH CV ECHO MEAS - IVSD: 1.3 CM
BH CV ECHO MEAS - LA DIMENSION: 3.9 CM
BH CV ECHO MEAS - LAT PEAK E' VEL: 6.9 CM/SEC
BH CV ECHO MEAS - LV MASS(C)D: 230.2 GRAMS
BH CV ECHO MEAS - LV MAX PG: 2.9 MMHG
BH CV ECHO MEAS - LV MEAN PG: 1.5 MMHG
BH CV ECHO MEAS - LV V1 MAX: 84.9 CM/SEC
BH CV ECHO MEAS - LV V1 VTI: 17.8 CM
BH CV ECHO MEAS - LVIDD: 4.6 CM
BH CV ECHO MEAS - LVIDS: 2.6 CM
BH CV ECHO MEAS - LVOT AREA: 3.1 CM2
BH CV ECHO MEAS - LVOT DIAM: 2 CM
BH CV ECHO MEAS - LVPWD: 1.3 CM
BH CV ECHO MEAS - MED PEAK E' VEL: 5.7 CM/SEC
BH CV ECHO MEAS - MV A MAX VEL: 111 CM/SEC
BH CV ECHO MEAS - MV DEC SLOPE: 361 CM/SEC2
BH CV ECHO MEAS - MV DEC TIME: 0.22 SEC
BH CV ECHO MEAS - MV E MAX VEL: 76.5 CM/SEC
BH CV ECHO MEAS - MV E/A: 0.69
BH CV ECHO MEAS - MV MAX PG: 7.4 MMHG
BH CV ECHO MEAS - MV MEAN PG: 2 MMHG
BH CV ECHO MEAS - MV P1/2T: 75.3 MSEC
BH CV ECHO MEAS - MV V2 VTI: 35.1 CM
BH CV ECHO MEAS - MVA(P1/2T): 2.9 CM2
BH CV ECHO MEAS - MVA(VTI): 1.59 CM2
BH CV ECHO MEAS - PA ACC TIME: 0.12 SEC
BH CV ECHO MEAS - PI END-D VEL: 85 CM/SEC
BH CV ECHO MEAS - RAP SYSTOLE: 3 MMHG
BH CV ECHO MEAS - RVSP: 15 MMHG
BH CV ECHO MEAS - SV(LVOT): 55.8 ML
BH CV ECHO MEAS - SV(MOD-SP2): 49.3 ML
BH CV ECHO MEAS - SV(MOD-SP4): 66 ML
BH CV ECHO MEAS - TAPSE (>1.6): 1.65 CM
BH CV ECHO MEAS - TR MAX PG: 12.3 MMHG
BH CV ECHO MEAS - TR MAX VEL: 175 CM/SEC
BH CV ECHO MEASUREMENTS AVERAGE E/E' RATIO: 12.14
BH CV VAS BP RIGHT ARM: NORMAL MMHG
BH CV XLRA - RV BASE: 3.6 CM
BH CV XLRA - RV LENGTH: 7 CM
BH CV XLRA - RV MID: 2.6 CM
BH CV XLRA - TDI S': 11.1 CM/SEC
BUN SERPL-MCNC: 28 MG/DL (ref 8–23)
BUN/CREAT SERPL: 31.8 (ref 7–25)
CALCIUM SPEC-SCNC: 9.7 MG/DL (ref 8.2–9.6)
CHLORIDE SERPL-SCNC: 103 MMOL/L (ref 98–107)
CHOLEST SERPL-MCNC: 152 MG/DL (ref 0–200)
CO2 SERPL-SCNC: 27 MMOL/L (ref 22–29)
CREAT SERPL-MCNC: 0.88 MG/DL (ref 0.76–1.27)
DEPRECATED RDW RBC AUTO: 42.3 FL (ref 37–54)
EGFRCR SERPLBLD CKD-EPI 2021: 81.2 ML/MIN/1.73
ERYTHROCYTE [DISTWIDTH] IN BLOOD BY AUTOMATED COUNT: 12.3 % (ref 12.3–15.4)
GLUCOSE BLDC GLUCOMTR-MCNC: 100 MG/DL (ref 70–130)
GLUCOSE BLDC GLUCOMTR-MCNC: 141 MG/DL (ref 70–130)
GLUCOSE SERPL-MCNC: 112 MG/DL (ref 65–99)
HBA1C MFR BLD: 5.4 % (ref 4.8–5.6)
HCT VFR BLD AUTO: 40.3 % (ref 37.5–51)
HDLC SERPL-MCNC: 53 MG/DL (ref 40–60)
HGB BLD-MCNC: 13.2 G/DL (ref 13–17.7)
LDLC SERPL CALC-MCNC: 80 MG/DL (ref 0–100)
LDLC/HDLC SERPL: 1.46 {RATIO}
LEFT ATRIUM VOLUME INDEX: 28.7 ML/M2
LV EF BIPLANE MOD: 56.1 %
MCH RBC QN AUTO: 30.8 PG (ref 26.6–33)
MCHC RBC AUTO-ENTMCNC: 32.8 G/DL (ref 31.5–35.7)
MCV RBC AUTO: 94.2 FL (ref 79–97)
PLATELET # BLD AUTO: 261 10*3/MM3 (ref 140–450)
PMV BLD AUTO: 8.9 FL (ref 6–12)
POTASSIUM SERPL-SCNC: 3.7 MMOL/L (ref 3.5–5.2)
RBC # BLD AUTO: 4.28 10*6/MM3 (ref 4.14–5.8)
SODIUM SERPL-SCNC: 139 MMOL/L (ref 136–145)
TRIGL SERPL-MCNC: 107 MG/DL (ref 0–150)
VLDLC SERPL-MCNC: 19 MG/DL (ref 5–40)
WBC NRBC COR # BLD AUTO: 6.46 10*3/MM3 (ref 3.4–10.8)

## 2025-02-03 PROCEDURE — 36415 COLL VENOUS BLD VENIPUNCTURE: CPT

## 2025-02-03 PROCEDURE — 82948 REAGENT STRIP/BLOOD GLUCOSE: CPT

## 2025-02-03 PROCEDURE — 80048 BASIC METABOLIC PNL TOTAL CA: CPT | Performed by: FAMILY MEDICINE

## 2025-02-03 PROCEDURE — 92523 SPEECH SOUND LANG COMPREHEN: CPT

## 2025-02-03 PROCEDURE — 99214 OFFICE O/P EST MOD 30 MIN: CPT | Performed by: STUDENT IN AN ORGANIZED HEALTH CARE EDUCATION/TRAINING PROGRAM

## 2025-02-03 PROCEDURE — 83036 HEMOGLOBIN GLYCOSYLATED A1C: CPT | Performed by: FAMILY MEDICINE

## 2025-02-03 PROCEDURE — G0378 HOSPITAL OBSERVATION PER HR: HCPCS

## 2025-02-03 PROCEDURE — 85027 COMPLETE CBC AUTOMATED: CPT | Performed by: FAMILY MEDICINE

## 2025-02-03 PROCEDURE — 93306 TTE W/DOPPLER COMPLETE: CPT | Performed by: INTERNAL MEDICINE

## 2025-02-03 PROCEDURE — 93306 TTE W/DOPPLER COMPLETE: CPT

## 2025-02-03 PROCEDURE — A9577 INJ MULTIHANCE: HCPCS | Performed by: FAMILY MEDICINE

## 2025-02-03 PROCEDURE — 80061 LIPID PANEL: CPT | Performed by: FAMILY MEDICINE

## 2025-02-03 PROCEDURE — 72156 MRI NECK SPINE W/O & W/DYE: CPT

## 2025-02-03 PROCEDURE — 70551 MRI BRAIN STEM W/O DYE: CPT

## 2025-02-03 PROCEDURE — 25510000002 GADOBENATE DIMEGLUMINE 529 MG/ML SOLUTION: Performed by: FAMILY MEDICINE

## 2025-02-03 RX ORDER — ACETAMINOPHEN 500 MG
500 TABLET ORAL EVERY 6 HOURS PRN
COMMUNITY

## 2025-02-03 RX ORDER — TAMSULOSIN HYDROCHLORIDE 0.4 MG/1
0.4 CAPSULE ORAL DAILY
Status: DISCONTINUED | OUTPATIENT
Start: 2025-02-03 | End: 2025-02-04 | Stop reason: HOSPADM

## 2025-02-03 RX ORDER — BISACODYL 10 MG
10 SUPPOSITORY, RECTAL RECTAL DAILY PRN
Status: DISCONTINUED | OUTPATIENT
Start: 2025-02-03 | End: 2025-02-04 | Stop reason: HOSPADM

## 2025-02-03 RX ORDER — SODIUM CHLORIDE 0.9 % (FLUSH) 0.9 %
10 SYRINGE (ML) INJECTION AS NEEDED
Status: DISCONTINUED | OUTPATIENT
Start: 2025-02-03 | End: 2025-02-04 | Stop reason: HOSPADM

## 2025-02-03 RX ORDER — LEVOTHYROXINE SODIUM 137 UG/1
137 TABLET ORAL
Status: DISCONTINUED | OUTPATIENT
Start: 2025-02-03 | End: 2025-02-04 | Stop reason: HOSPADM

## 2025-02-03 RX ORDER — HYDROCHLOROTHIAZIDE 25 MG/1
25 TABLET ORAL DAILY
Status: DISCONTINUED | OUTPATIENT
Start: 2025-02-03 | End: 2025-02-04 | Stop reason: HOSPADM

## 2025-02-03 RX ORDER — CLOPIDOGREL BISULFATE 75 MG/1
300 TABLET ORAL ONCE
Status: COMPLETED | OUTPATIENT
Start: 2025-02-03 | End: 2025-02-03

## 2025-02-03 RX ORDER — CYCLOSPORINE 0.5 MG/ML
1 EMULSION OPHTHALMIC 2 TIMES DAILY
Status: DISCONTINUED | OUTPATIENT
Start: 2025-02-03 | End: 2025-02-04 | Stop reason: HOSPADM

## 2025-02-03 RX ORDER — BRIMONIDINE TARTRATE 2 MG/ML
1 SOLUTION/ DROPS OPHTHALMIC 2 TIMES DAILY
Status: DISCONTINUED | OUTPATIENT
Start: 2025-02-03 | End: 2025-02-04 | Stop reason: HOSPADM

## 2025-02-03 RX ORDER — LATANOPROST 50 UG/ML
1 SOLUTION/ DROPS OPHTHALMIC NIGHTLY
Status: DISCONTINUED | OUTPATIENT
Start: 2025-02-03 | End: 2025-02-04 | Stop reason: HOSPADM

## 2025-02-03 RX ORDER — AMOXICILLIN 250 MG
2 CAPSULE ORAL 2 TIMES DAILY PRN
Status: DISCONTINUED | OUTPATIENT
Start: 2025-02-03 | End: 2025-02-04 | Stop reason: HOSPADM

## 2025-02-03 RX ORDER — FERROUS SULFATE 325(65) MG
325 TABLET ORAL
Status: DISCONTINUED | OUTPATIENT
Start: 2025-02-03 | End: 2025-02-04 | Stop reason: HOSPADM

## 2025-02-03 RX ORDER — ACETAMINOPHEN 650 MG/1
650 SUPPOSITORY RECTAL EVERY 4 HOURS PRN
Status: DISCONTINUED | OUTPATIENT
Start: 2025-02-03 | End: 2025-02-04 | Stop reason: HOSPADM

## 2025-02-03 RX ORDER — DORZOLAMIDE HYDROCHLORIDE AND TIMOLOL MALEATE 20; 5 MG/ML; MG/ML
SOLUTION/ DROPS OPHTHALMIC 2 TIMES DAILY
Status: DISCONTINUED | OUTPATIENT
Start: 2025-02-03 | End: 2025-02-04 | Stop reason: HOSPADM

## 2025-02-03 RX ORDER — ASPIRIN 81 MG/1
81 TABLET, CHEWABLE ORAL DAILY
Status: DISCONTINUED | OUTPATIENT
Start: 2025-02-03 | End: 2025-02-04 | Stop reason: HOSPADM

## 2025-02-03 RX ORDER — SODIUM CHLORIDE 9 MG/ML
40 INJECTION, SOLUTION INTRAVENOUS AS NEEDED
Status: DISCONTINUED | OUTPATIENT
Start: 2025-02-03 | End: 2025-02-04 | Stop reason: HOSPADM

## 2025-02-03 RX ORDER — CLOPIDOGREL BISULFATE 75 MG/1
75 TABLET ORAL DAILY
Status: DISCONTINUED | OUTPATIENT
Start: 2025-02-04 | End: 2025-02-04 | Stop reason: HOSPADM

## 2025-02-03 RX ORDER — NITROGLYCERIN 0.4 MG/1
0.4 TABLET SUBLINGUAL
Status: DISCONTINUED | OUTPATIENT
Start: 2025-02-03 | End: 2025-02-04 | Stop reason: HOSPADM

## 2025-02-03 RX ORDER — POLYETHYLENE GLYCOL 3350 17 G/17G
17 POWDER, FOR SOLUTION ORAL DAILY PRN
Status: DISCONTINUED | OUTPATIENT
Start: 2025-02-03 | End: 2025-02-04 | Stop reason: HOSPADM

## 2025-02-03 RX ORDER — ACETAMINOPHEN 160 MG/5ML
500 SOLUTION ORAL EVERY 6 HOURS PRN
Status: DISCONTINUED | OUTPATIENT
Start: 2025-02-03 | End: 2025-02-04 | Stop reason: HOSPADM

## 2025-02-03 RX ORDER — SODIUM CHLORIDE 0.9 % (FLUSH) 0.9 %
10 SYRINGE (ML) INJECTION EVERY 12 HOURS SCHEDULED
Status: DISCONTINUED | OUTPATIENT
Start: 2025-02-03 | End: 2025-02-04 | Stop reason: HOSPADM

## 2025-02-03 RX ORDER — ATORVASTATIN CALCIUM 40 MG/1
80 TABLET, FILM COATED ORAL NIGHTLY
Status: DISCONTINUED | OUTPATIENT
Start: 2025-02-03 | End: 2025-02-04

## 2025-02-03 RX ORDER — ACETAMINOPHEN 500 MG
500 TABLET ORAL EVERY 6 HOURS PRN
Status: DISCONTINUED | OUTPATIENT
Start: 2025-02-03 | End: 2025-02-04 | Stop reason: HOSPADM

## 2025-02-03 RX ORDER — ASPIRIN 300 MG/1
300 SUPPOSITORY RECTAL DAILY
Status: DISCONTINUED | OUTPATIENT
Start: 2025-02-03 | End: 2025-02-04 | Stop reason: HOSPADM

## 2025-02-03 RX ORDER — BISACODYL 5 MG/1
5 TABLET, DELAYED RELEASE ORAL DAILY PRN
Status: DISCONTINUED | OUTPATIENT
Start: 2025-02-03 | End: 2025-02-04 | Stop reason: HOSPADM

## 2025-02-03 RX ADMIN — BRIMONIDINE TARTRATE 1 DROP: 2 SOLUTION/ DROPS OPHTHALMIC at 22:11

## 2025-02-03 RX ADMIN — Medication 10 ML: at 21:06

## 2025-02-03 RX ADMIN — FERROUS SULFATE TAB 325 MG (65 MG ELEMENTAL FE) 325 MG: 325 (65 FE) TAB at 08:06

## 2025-02-03 RX ADMIN — Medication 10 ML: at 08:01

## 2025-02-03 RX ADMIN — LATANOPROST 1 DROP: 50 SOLUTION OPHTHALMIC at 21:05

## 2025-02-03 RX ADMIN — CLOPIDOGREL BISULFATE 300 MG: 75 TABLET ORAL at 01:28

## 2025-02-03 RX ADMIN — Medication 10 ML: at 21:05

## 2025-02-03 RX ADMIN — DORZOLAMIDE HYDROCHLORIDE: 20 SOLUTION/ DROPS OPHTHALMIC at 22:10

## 2025-02-03 RX ADMIN — ATORVASTATIN CALCIUM 80 MG: 40 TABLET, FILM COATED ORAL at 22:11

## 2025-02-03 RX ADMIN — BRIMONIDINE TARTRATE 1 DROP: 2 SOLUTION/ DROPS OPHTHALMIC at 08:14

## 2025-02-03 RX ADMIN — LEVOTHYROXINE SODIUM 137 MCG: 0.14 TABLET ORAL at 08:06

## 2025-02-03 RX ADMIN — Medication 10 ML: at 08:08

## 2025-02-03 RX ADMIN — ASPIRIN 81 MG CHEWABLE TABLET 81 MG: 81 TABLET CHEWABLE at 01:28

## 2025-02-03 RX ADMIN — CYCLOSPORINE 1 DROP: 0.5 EMULSION OPHTHALMIC at 08:10

## 2025-02-03 RX ADMIN — CYCLOSPORINE 1 DROP: 0.5 EMULSION OPHTHALMIC at 22:10

## 2025-02-03 RX ADMIN — DORZOLAMIDE HYDROCHLORIDE: 20 SOLUTION/ DROPS OPHTHALMIC at 08:44

## 2025-02-03 RX ADMIN — GADOBENATE DIMEGLUMINE 15 ML: 529 INJECTION, SOLUTION INTRAVENOUS at 05:57

## 2025-02-03 NOTE — H&P
[facility] Medicine Services  HISTORY AND PHYSICAL    Patient Name: Juacnho Smith Jr.  : 1933  MRN: [id number]  Primary Care Physician: Rolando Saavedra MD  Date of admission: 2025      Subjective   Subjective     Chief Complaint:  Left face and left upper extremity paresthesias    HPI:  Juancho Smith Jr. is a 89 y.o. 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 Last known well approximately 1900 this day.  Patient was seen by stroke team while in the emergency department. CT imaging significant for multifocal high-grade stenoses in the right MCA territory, otherwise unrevealing.  Patient was deemed not a candidate for IV thrombolytic therapy or emergent neurointervention due to resolving symptoms and NIH of 0.      Patient reports since being in the hospital his symptoms have mostly resolved.  Denies nausea vomiting fevers chills.  He does report a history of some cervical changes and C6-C7 years ago.      Personal History     Past Medical History:   Diagnosis Date    Diabetes mellitus     Disease of thyroid gland     Glaucoma     Prostate cancer          Oncology Problem List:  Prostate cancer (Status: Active)    Oncology/Hematology History   Prostate cancer   2022 Cancer Staged    Staging form: Prostate, AJCC 8th Edition  - Clinical stage from 2022: Stage IIIA (cT1c, cN0, cM0, PSA: 21.1, Grade Group: 4) - Signed by Jovan Cortez MD on 2022 Initial Diagnosis    Prostate cancer (HCC)     2022 - 2022 Radiation    Radiation OncologyTreatment Course:  Juancho Smith Jr. received 3500 cGy in 5 fractions to prostate and seminal vesicles via Stereotactic Radiation Therapy - SRT.       Past Surgical History:   Procedure Laterality Date    CATARACT EXTRACTION      CHOLECYSTECTOMY       COLONOSCOPY      last one at 82    CYBERKNIFE  09/30/2022    prostate/SV    PARATHYROID GLAND SURGERY      PROSTATE BIOPSY      PROSTATE FIDUCIAL MARKER PLACEMENT  08/19/2022    THYROID SURGERY         Family History: family history includes Cancer in his sister; Lung cancer in his mother; Pancreatitis in his mother; Prader-Willi syndrome in his sister.     Social History:  reports that he quit smoking about 61 years ago. His smoking use included cigarettes. He has never used smokeless tobacco. He reports current alcohol use. He reports that he does not use drugs.  Social History     Social History Narrative    Not on file       Medications:  Available home medication information reviewed.  Misc Natural Products, Pseudoephedrine-Naproxen Na, bimatoprost, brimonidine, cycloSPORINE, dorzolamide-timolol, dutasteride, ferrous sulfate, fluticasone, hydroCHLOROthiazide, levothyroxine, meloxicam, multivitamin, mupirocin, naproxen sodium, omeprazole, polyethyl glycol-propyl glycol, tamsulosin, and triamcinolone    No Known Allergies    Objective   Objective     Vital Signs:   Temp:  [97.9 °F (36.6 °C)] 97.9 °F (36.6 °C)  Heart Rate:  [63-71] 64  Resp:  [18] 18  BP: (137-179)/(59-90) 164/67       Physical Exam   Constitutional:  male no acute distress, awake, alert  HENT: NCAT, mucous membranes moist  Respiratory: Clear to auscultation bilaterally, respiratory effort normal   Cardiovascular: RRR, no murmurs, rubs, or gallops  Gastrointestinal: Positive bowel sounds, soft, nontender, nondistended  Musculoskeletal: No bilateral ankle edema, bilateral upper extremity lower extremity strength 5 out of 5  Psychiatric: Appropriate affect, cooperative  Neurologic: Oriented x 3, speech clear, nonfocal  Skin: No rashes    Result Review:  I have personally reviewed the results from the time of this admission to 2/3/2025 03:18 EST and agree with these findings:  [x]  Laboratory list / accordion  []  Microbiology  [x]   Radiology  []  EKG/Telemetry   []  Cardiology/Vascular   []  Pathology  []  Old records  []  Other:        LAB RESULTS:      Lab 02/02/25  2243   WBC 6.29   HEMOGLOBIN 13.4   HEMATOCRIT 39.9   PLATELETS 274   NEUTROS ABS 4.13   IMMATURE GRANS (ABS) 0.03   LYMPHS ABS 1.21   MONOS ABS 0.66   EOS ABS 0.21   MCV 93.0   PROTIME 12.7   INR 0.95   APTT 33.2         Lab 02/02/25  2243   SODIUM 144   POTASSIUM 4.1   CHLORIDE 104   CO2 29.0   ANION GAP 11.0   BUN 31*   CREATININE 1.00   EGFR 71.1   GLUCOSE 117*   CALCIUM 10.2*         Lab 02/02/25  2243   TOTAL PROTEIN 6.9   ALBUMIN 4.2   GLOBULIN 2.7   ALT (SGPT) 21   AST (SGOT) 23   BILIRUBIN 0.2   ALK PHOS 78                         Microbiology Results (last 10 days)       ** No results found for the last 240 hours. **            CT Angiogram Head w AI Analysis of LVO    Result Date: 2/3/2025  CT ANGIOGRAM HEAD W AI ANALYSIS OF LVO CT ANGIOGRAM NECK Date of Exam: 2/2/2025 11:39 PM EST Indication: Neuro deficit, acute, stroke suspected Comparison: None available. Technique: CTA of the head and neck was performed after the uneventful intravenous administration of 120 mL of Isovue-370. Reconstructed coronal and sagittal images were also obtained. In addition, a 3-D volume rendered image was created for interpretation. Automated exposure control and iterative reconstruction methods were used. Findings: Aorta: There is mild aortic plaque. There is conventional three-vessel arch anatomy. The right brachiocephalic and bilateral subclavian arteries appear widely patent. Right carotid: There is mild nonstenosing CCA plaque. There is mild nonstenosing mixed plaque at the carotid bifurcation. ECA and distal branches appear normal. Cervical ICA is tortuous, but widely patent. There is mild nonstenosing segmental calcific intracranial ICA plaque. There is a patent P-comm. There is a patent A-Comm. Right A1 segment is hypoplastic. There is a focal short segment high-grade stenosis in  the pericallosal LISA with patent distal branches. There is a normal right M1 segment. There are 2 focal areas of high-grade M2 stenoses. There is a focal high-grade M3 stenosis in the posterior insular region. There is a high-grade stenosis in a more distal M3 segment. Left carotid: There is mild nonstenosing CCA and carotid bifurcation plaque. ECA and distal branches are patent. Cervical ICA is tortuous, but widely patent. There is mild nonstenosing intracranial ICA plaque. No definite P-comm. Normal A1 and M1 segments. Distal LISA branches are patent. There is a moderate grade focal stenosis in a left M2 branch. Distal MCA branches are patent. Posterior circulation: There is moderate focal stenosis at the right vertebral artery origin. The vertebral arteries otherwise appear uniform caliber throughout the neck. There is moderate focal narrowing of the mid right V4 segment. There is mild left V4 segment disease. Basilar artery and superior cerebellar arteries appear patent. P1 segments are normal. There are high-grade stenoses in the distal PCA branches bilaterally. Nonvascular findings: No acute intracranial abnormality. Thinning of the orbital lenses would suggest prior cataract surgery. There is mild left maxillary sinus mucosal disease. Mastoid air cells appear well aerated. Salivary glands are symmetric. There is no evidence of a neck mass or adenopathy. Left thyroid lobe is atrophic. Right thyroid lobe is heterogeneous and asymmetrically prominent. The lung apices are clear. There is no focal soft tissue inflammation or fluid collection. No acute osseous abnormality or destructive bone lesion. There is moderate cervical spondylosis..     Impression: Impression: 1.No evidence of large vessel occlusion. 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 A moderate grade stenosis in a left M2 branch. There are high-grade stenoses in the distal PCA branches bilaterally. A focal high-grade  stenosis in the pericallosal right LISA. 3.There is moderate focal stenosis at the right vertebral artery origin and there is moderate focal stenosis in the mid right V4 segment. 4.Mild nonstenosing plaque in the carotid arteries. 5.Cervical spondylosis. Electronically Signed: Rolando Flores MD  2/3/2025 12:42 AM EST  Workstation ID: [id number]    CT Angiogram Neck    Result Date: 2/3/2025  CT ANGIOGRAM HEAD W AI ANALYSIS OF LVO CT ANGIOGRAM NECK Date of Exam: 2/2/2025 11:39 PM EST Indication: Neuro deficit, acute, stroke suspected Comparison: None available. Technique: CTA of the head and neck was performed after the uneventful intravenous administration of 120 mL of Isovue-370. Reconstructed coronal and sagittal images were also obtained. In addition, a 3-D volume rendered image was created for interpretation. Automated exposure control and iterative reconstruction methods were used. Findings: Aorta: There is mild aortic plaque. There is conventional three-vessel arch anatomy. The right brachiocephalic and bilateral subclavian arteries appear widely patent. Right carotid: There is mild nonstenosing CCA plaque. There is mild nonstenosing mixed plaque at the carotid bifurcation. ECA and distal branches appear normal. Cervical ICA is tortuous, but widely patent. There is mild nonstenosing segmental calcific intracranial ICA plaque. There is a patent P-comm. There is a patent A-Comm. Right A1 segment is hypoplastic. There is a focal short segment high-grade stenosis in the pericallosal LISA with patent distal branches. There is a normal right M1 segment. There are 2 focal areas of high-grade M2 stenoses. There is a focal high-grade M3 stenosis in the posterior insular region. There is a high-grade stenosis in a more distal M3 segment. Left carotid: There is mild nonstenosing CCA and carotid bifurcation plaque. ECA and distal branches are patent. Cervical ICA is tortuous, but widely patent. There is mild nonstenosing  intracranial ICA plaque. No definite P-comm. Normal A1 and M1 segments. Distal LISA branches are patent. There is a moderate grade focal stenosis in a left M2 branch. Distal MCA branches are patent. Posterior circulation: There is moderate focal stenosis at the right vertebral artery origin. The vertebral arteries otherwise appear uniform caliber throughout the neck. There is moderate focal narrowing of the mid right V4 segment. There is mild left V4 segment disease. Basilar artery and superior cerebellar arteries appear patent. P1 segments are normal. There are high-grade stenoses in the distal PCA branches bilaterally. Nonvascular findings: No acute intracranial abnormality. Thinning of the orbital lenses would suggest prior cataract surgery. There is mild left maxillary sinus mucosal disease. Mastoid air cells appear well aerated. Salivary glands are symmetric. There is no evidence of a neck mass or adenopathy. Left thyroid lobe is atrophic. Right thyroid lobe is heterogeneous and asymmetrically prominent. The lung apices are clear. There is no focal soft tissue inflammation or fluid collection. No acute osseous abnormality or destructive bone lesion. There is moderate cervical spondylosis..     Impression: Impression: 1.No evidence of large vessel occlusion. 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 A moderate grade stenosis in a left M2 branch. There are high-grade stenoses in the distal PCA branches bilaterally. A focal high-grade stenosis in the pericallosal right LISA. 3.There is moderate focal stenosis at the right vertebral artery origin and there is moderate focal stenosis in the mid right V4 segment. 4.Mild nonstenosing plaque in the carotid arteries. 5.Cervical spondylosis. Electronically Signed: Rolando Flores MD  2/3/2025 12:42 AM EST  Workstation ID: [id number]    CT CEREBRAL PERFUSION WITH & WITHOUT CONTRAST    Result Date: 2/3/2025  CT CEREBRAL PERFUSION W WO CONTRAST Date of  Exam: 2/2/2025 11:39 PM EST Indication: Neuro deficit, acute, stroke suspected  Comparison: None available. Technique: Axial CT images of the brain were obtained prior to and after the administration of 120 mL Isovue-370. Core blood volume, core blood flow, mean transit time, and Tmax images were obtained utilizing the Rapid software protocol. A limited CT angiogram of the head was also performed to measure the blood vessel density. The radiation dose reduction device was turned on for each scan per the ALARA (As Low as Reasonably Achievable) protocol. Findings: Cerebral blood flow, blood volume and mean transit time maps are symmetric without large perfusion defect. CBF<30% volume: 0 mL Tmax>6sec volume: 0 mL Mismatch volume: 0 mL Mismatch ratio: None.     Impression: Impression: No significant perfusion abnormality in the brain. Electronically Signed: Rolando Flores MD  2/3/2025 12:30 AM EST  Workstation ID: [id number]    CT Head Without Contrast Stroke Protocol    Result Date: 2/3/2025  CT HEAD WO CONTRAST STROKE PROTOCOL Date of Exam: 2/2/2025 11:39 PM EST Indication: Neuro deficit, acute, stroke suspected Comparison: None available. Technique: Axial CT images were obtained of the head without contrast administration.  Reconstructed coronal images were also obtained. Automated exposure control and iterative construction methods were used. Scan Time: 2349 hours Results discussed with stroke team at 0005 hours. Findings: Superficial soft tissues appear within normal limits. The calvarium is intact.  Paranasal sinuses and mastoid air cells appear well aerated. There is thinning of the orbital lenses bilaterally suggesting prior lens replacement. There is no acute intracranial hemorrhage.  No mass effect or midline shift.  No abnormal extra-axial collections.  Gray-white differentiation is within normal limits. There is mild patchy white matter hypoattenuation. The ventricles appear normal in size and configuration  for the patient's age.     Impression: Impression: 1.No acute intracranial abnormality. 2.Mild chronic small vessel ischemic change. Electronically Signed: Rolando Flores MD  2/3/2025 12:08 AM EST  Workstation ID: [id number]    XR Chest 1 View    Result Date: 2/2/2025  XR CHEST 1 VW Date of Exam: 2/2/2025 10:04 PM EST Indication: Acute Stroke Protocol (onset < 12 hrs) Comparison: None available. Findings: There is no pneumothorax, pleural effusion or focal airspace consolidation. Heart size and pulmonary vasculature appear within normal limits. Regional bones appear intact.     Impression: Impression: No acute cardiopulmonary abnormality. 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 ED 2/2/2025 with concern for left face and left hand paresthesias.  Last known well 1900 on 2/2.  On my exam, patient states symptoms have significantly improved but mildly persist.  NIH is 0, no sensory deficit to light touch despite paresthesias.  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  Patient was not a candidate for IV thrombolytic therapy or emergent neurointervention due to improving symptoms and NIH of 0.     Left face and LUE paresthesias  -Differentials include acute stroke/TIA  -MRI brain without contrast pending  -Initiate DAPT with aspirin 81 mg daily and Plavix 300 mg load followed by 75 mg daily  -Allow autoregulation of blood pressure, SBP goal < 200  -NPO pending bedside swallow eval  -TTE pending  -LDL pending, start atorvastatin 80 mg nightly  -A1c pending  -Serial neurochecks per policy, stat CTH for any acute neurological change  -PT/OT/SLP as appropriate    Hx of cervical spine stenosis?  -given above hx, will order MRI of the C  spine     Glaucoma  Hypothyroidism  -Continue with home medications    Hypertension  -Hold hydrochlorothiazide    VTE Prophylaxis:  Mechanical VTE prophylaxis orders are present.          CODE STATUS:    Code Status and Medical Interventions: CPR (Attempt to Resuscitate); Full Support   Ordered at: 02/03/25 0303     Level Of Support Discussed With:    Patient     Code Status (Patient has no pulse and is not breathing):    CPR (Attempt to Resuscitate)     Medical Interventions (Patient has pulse or is breathing):    Full Support       Expected Discharge   Expected discharge date/ time has not been documented.     Jennifer Rosenberg MD  02/03/25

## 2025-02-03 NOTE — ED PROVIDER NOTES
[place]    EMERGENCY DEPARTMENT ENCOUNTER      Pt Name: Juancho Smith Jr.  MRN: [id number]  YOB: 1933  Date of evaluation: 2/2/2025  Provider: Kwame Hartman MD    CHIEF COMPLAINT       Chief Complaint   Patient presents with    Tingling         HISTORY OF PRESENT ILLNESS   Juancho Smith Jr. is a 89 y.o. male who presents to the emergency department for evaluation of acute onset numbness in his left upper extremity and left face that occurred around 7 30-8 o'clock tonight.  Symptoms persisted until about 15 to 20 minutes ago when they began resolving and he has no ongoing symptoms at this time.  He never experienced any confusion, speech difficulty, weakness.  Patient's primary concern is for stroke.  He has never had a stroke before.  He does not have any headache.    REVIEW OF SYSTEMS     ROS:  A chief complaint appropriate review of systems was completed and is negative except as noted in the HPI.      PAST MEDICAL HISTORY     Past Medical History:   Diagnosis Date    Diabetes mellitus     Disease of thyroid gland     Glaucoma     Prostate cancer          SURGICAL HISTORY       Past Surgical History:   Procedure Laterality Date    CATARACT EXTRACTION      CHOLECYSTECTOMY      COLONOSCOPY      last one at 82    CYBERKNIFE  09/30/2022    prostate/SV    PARATHYROID GLAND SURGERY      PROSTATE BIOPSY      PROSTATE FIDUCIAL MARKER PLACEMENT  08/19/2022    THYROID SURGERY           CURRENT MEDICATIONS       Current Facility-Administered Medications:     acetaminophen (TYLENOL) tablet 500 mg, 500 mg, Oral, Q6H PRN **OR** acetaminophen (TYLENOL) 160 MG/5ML oral solution 500 mg, 500 mg, Oral, Q6H PRN **OR** acetaminophen (TYLENOL) suppository 650 mg, 650 mg, Rectal, Q4H PRN, Jennifer Rosenberg MD    aspirin chewable tablet 81 mg, 81 mg, Oral, Daily, 81 mg at 02/03/25 0128 **OR** aspirin suppository 300 mg, 300 mg, Rectal, Daily, Jennifer Rosenberg MD    atorvastatin (LIPITOR) tablet 80 mg, 80 mg, Oral, Nightly,  Jennifer Rosenberg MD    sennosides-docusate (PERICOLACE) 8.6-50 MG per tablet 2 tablet, 2 tablet, Oral, BID PRN **AND** polyethylene glycol (MIRALAX) packet 17 g, 17 g, Oral, Daily PRN **AND** bisacodyl (DULCOLAX) EC tablet 5 mg, 5 mg, Oral, Daily PRN **AND** bisacodyl (DULCOLAX) suppository 10 mg, 10 mg, Rectal, Daily PRN, Jennifer Rosenberg MD    brimonidine (ALPHAGAN) 0.2 % ophthalmic solution 1 drop, 1 drop, Both Eyes, BID, Jennifer Rosenberg MD    Calcium Replacement - Follow Nurse / BPA Driven Protocol, , Not Applicable, PRN, Jennifer Rosenberg MD    [COMPLETED] clopidogrel (PLAVIX) tablet 300 mg, 300 mg, Oral, Once, 300 mg at 02/03/25 0128 **AND** [START ON 2/4/2025] clopidogrel (PLAVIX) tablet 75 mg, 75 mg, Oral, Daily, Jennifer Rosenberg MD    cycloSPORINE (RESTASIS) 0.05 % ophthalmic emulsion 1 drop, 1 drop, Both Eyes, BID, Jennifer Rosenberg MD    dorzolamide (TRUSOPT) 2 % 1 drop, timolol (TIMOPTIC) 0.5 % 1 drop for Cosopt 22.3-6.8 mg/mL, , Both Eyes, BID, Jennifer Rosenberg MD    ferrous sulfate tablet 325 mg, 325 mg, Oral, Daily With Breakfast, Jennifer Rosenberg MD    [Held by provider] hydroCHLOROthiazide tablet 25 mg, 25 mg, Oral, Daily, Jennifer Rosenberg MD    latanoprost (XALATAN) 0.005 % ophthalmic solution 1 drop, 1 drop, Both Eyes, Nightly, Jennifer Rosenberg MD    levothyroxine (SYNTHROID, LEVOTHROID) tablet 137 mcg, 137 mcg, Oral, Q AM, Jennifer Rosenberg MD    Magnesium Standard Dose Replacement - Follow Nurse / BPA Driven Protocol, , Not Applicable, PRN, Jennifer Rosenberg MD    nitroglycerin (NITROSTAT) SL tablet 0.4 mg, 0.4 mg, Sublingual, Q5 Min PRN, Jennifer Rosenberg MD    Phosphorus Replacement - Follow Nurse / BPA Driven Protocol, , Not Applicable, PRN, Chet, Jennifer, MD    Potassium Replacement - Follow Nurse / BPA Driven Protocol, , Not Applicable, Chet VALENZUELA Laurie, MD    sodium chloride 0.9 % flush 10 mL, 10 mL, Intravenous, Chet VALENZUELA Laurie, MD    sodium chloride 0.9 % flush 10  mL, 10 mL, Intravenous, Q12H, Jennifer Rosenberg MD    sodium chloride 0.9 % flush 10 mL, 10 mL, Intravenous, PRN, Jennifer Rosenberg MD    sodium chloride 0.9 % flush 10 mL, 10 mL, Intravenous, Q12H, Jennifer Rosenberg MD    sodium chloride 0.9 % flush 10 mL, 10 mL, Intravenous, PRN, Jennifer Rosenberg MD    sodium chloride 0.9 % infusion 40 mL, 40 mL, Intravenous, PRN, Jennifer Rosenberg MD    sodium chloride 0.9 % infusion 40 mL, 40 mL, Intravenous, PRN, Jennifer Rosenberg MD    [Held by provider] tamsulosin (FLOMAX) 24 hr capsule 0.4 mg, 0.4 mg, Oral, Daily, Jennifer Rosenberg MD    Current Outpatient Medications:     brimonidine (ALPHAGAN) 0.2 % ophthalmic solution, , Disp: , Rfl:     cycloSPORINE (Restasis) 0.05 % ophthalmic emulsion, Restasis 0.05 % eye drops in a dropperette, Disp: , Rfl:     dorzolamide-timolol (COSOPT) 2-0.5 % ophthalmic solution, , Disp: , Rfl:     dutasteride (AVODART) 0.5 MG capsule, Take 1 capsule by mouth Daily., Disp: , Rfl:     ferrous sulfate 324 (65 Fe) MG tablet delayed-release EC tablet, Take 324 mg by mouth Daily With Breakfast., Disp: , Rfl:     ferrous sulfate 324 MG tablet delayed-release, Take 324 mg by mouth Daily., Disp: , Rfl:     fluticasone (FLONASE) 50 MCG/ACT nasal spray, 2 sprays by Each Nare route Daily., Disp: , Rfl:     hydroCHLOROthiazide 25 MG tablet, Take 1 tablet by mouth Daily., Disp: , Rfl:     levothyroxine (SYNTHROID, LEVOTHROID) 137 MCG tablet, Take 1 tablet by mouth Daily., Disp: , Rfl:     levothyroxine (SYNTHROID, LEVOTHROID) 137 MCG tablet, Take 1 tablet by mouth Daily., Disp: , Rfl:     Lumigan 0.01 % ophthalmic drops, Apply 1 drop to eye(s) as directed by provider., Disp: , Rfl:     meloxicam (MOBIC) 7.5 MG tablet, meloxicam 7.5 mg tablet, Disp: , Rfl:     Misc Natural Products (OSTEO BI-FLEX ADV JOINT SHIELD PO), Take  by mouth., Disp: , Rfl:     multivitamin (THERAGRAN) tablet tablet, Multivitamin 50 Plus  Daily, Disp: , Rfl:     mupirocin (BACTROBAN) 2  % ointment, mupirocin 2 % topical ointment, Disp: , Rfl:     naproxen sodium (ALEVE) 220 MG tablet, Take 220 mg by mouth 2 (Two) Times a Day As Needed., Disp: , Rfl:     omeprazole (priLOSEC) 20 MG capsule, Take 20 mg by mouth Daily., Disp: , Rfl:     polyethyl glycol-propyl glycol (SYSTANE) 0.4-0.3 % solution ophthalmic solution (artificial tears), Every 1 (One) Hour As Needed., Disp: , Rfl:     Pseudoephedrine-Naproxen Na (ALEVE-D SINUS & COLD PO), Daily., Disp: , Rfl:     tamsulosin (FLOMAX) 0.4 MG capsule 24 hr capsule, Take 1 capsule by mouth Daily., Disp: , Rfl:     triamcinolone (KENALOG) 0.1 % cream, triamcinolone acetonide 0.1 % topical cream, Disp: , Rfl:     ALLERGIES     Patient has no known allergies.    FAMILY HISTORY       Family History   Problem Relation Age of Onset    Pancreatitis Mother     Lung cancer Mother     Cancer Sister     Prader-Willi syndrome Sister           SOCIAL HISTORY       Social History     Socioeconomic History    Marital status:    Tobacco Use    Smoking status: Former     Current packs/day: 0.00     Types: Cigarettes     Quit date:      Years since quittin.1    Smokeless tobacco: Never   Vaping Use    Vaping status: Never Used   Substance and Sexual Activity    Alcohol use: Yes     Comment: 5 per week wine or bourbon    Drug use: Never    Sexual activity: Defer         PHYSICAL EXAM    (up to 7 for level 4, 8 or more for level 5)     Vitals:    25 0231 25 0331 25 0401 25 0431   BP: 157/65 155/64 165/72 161/67   Pulse: 57 64 67 62   Resp:       Temp:       TempSrc:       SpO2: 93% 95% 93% 96%   Weight:       Height:           Physical Exam  Constitutional:       General: He is not in acute distress.  HENT:      Head: Normocephalic and atraumatic.   Eyes:      Conjunctiva/sclera: Conjunctivae normal.      Pupils: Pupils are equal, round, and reactive to light.   Cardiovascular:      Rate and Rhythm: Normal rate and regular rhythm.       Pulses: Normal pulses.      Heart sounds: No murmur heard.     No gallop.   Pulmonary:      Effort: Pulmonary effort is normal. No respiratory distress.   Abdominal:      General: Abdomen is flat. There is no distension.      Tenderness: There is no abdominal tenderness.   Musculoskeletal:         General: No swelling or deformity. Normal range of motion.   Skin:     General: Skin is warm and dry.      Capillary Refill: Capillary refill takes less than 2 seconds.   Neurological:      General: No focal deficit present.      Mental Status: He is alert and oriented to person, place, and time.      Comments: GCS 15.  Cranial Nerves II-XII intact without deficit.  Strength 5/5 in the bilateral upper extremities.  Strength 5/5 in the bilateral lower extremities.  Sensation to light touch intact throughout.  Cerebellar function intact via finger-nose-finger.     Psychiatric:         Mood and Affect: Mood normal.         Behavior: Behavior normal.            DIAGNOSTIC RESULTS     EKG: All EKGs are interpreted by the Emergency Department Physician who either signs or Co-signs this chart in the absence of a cardiologist.    ECG 12 Lead Stroke Evaluation   Preliminary Result   Test Reason : Stroke Evaluation   Blood Pressure :   */*   mmHG   Vent. Rate :  61 BPM     Atrial Rate :  61 BPM      P-R Int : 256 ms          QRS Dur :  92 ms       QT Int : 420 ms       P-R-T Axes : -19 112 -22 degrees     QTcB Int : 422 ms      Sinus rhythm with 1st degree AV block   Incomplete right bundle branch block   Right ventricular hypertrophy   Possible Anterior infarct , age undetermined   T wave abnormality, consider inferior ischemia   Abnormal ECG   No previous ECGs available      Referred By: EDMD           Confirmed By:             RADIOLOGY:   [x] Radiologist's Report Reviewed:  CT Head Without Contrast Stroke Protocol   Final Result   Impression:   1.No acute intracranial abnormality.   2.Mild chronic small vessel ischemic change.                   Electronically Signed: Rolando Flores MD     2/3/2025 12:08 AM EST     Workstation ID: [id number]      CT Angiogram Head w AI Analysis of LVO   Final Result   Impression:   1.No evidence of large vessel occlusion.   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 A moderate grade stenosis in a left M2 branch. There are high-grade stenoses in the distal PCA branches bilaterally. A focal high-grade stenosis in the pericallosal right LISA.   3.There is moderate focal stenosis at the right vertebral artery origin and there is moderate focal stenosis in the mid right V4 segment.   4.Mild nonstenosing plaque in the carotid arteries.   5.Cervical spondylosis.            Electronically Signed: Rolando Flores MD     2/3/2025 12:42 AM EST     Workstation ID: [id number]      CT Angiogram Neck   Final Result   Impression:   1.No evidence of large vessel occlusion.   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 A moderate grade stenosis in a left M2 branch. There are high-grade stenoses in the distal PCA branches bilaterally. A focal high-grade stenosis in the pericallosal right LISA.   3.There is moderate focal stenosis at the right vertebral artery origin and there is moderate focal stenosis in the mid right V4 segment.   4.Mild nonstenosing plaque in the carotid arteries.   5.Cervical spondylosis.            Electronically Signed: Rolando Flores MD     2/3/2025 12:42 AM EST     Workstation ID: [id number]      CT CEREBRAL PERFUSION WITH & WITHOUT CONTRAST   Final Result   Impression:   No significant perfusion abnormality in the brain.                  Electronically Signed: Rolando Flores MD     2/3/2025 12:30 AM EST     Workstation ID: [id number]      XR Chest 1 View   Final Result   Impression:   No acute cardiopulmonary abnormality.               Electronically Signed: Rolando Flores MD     2/2/2025 10:47 PM EST     Workstation ID: [id number]      MRI Brain Without Contrast     (Results Pending)   MRI Cervical Spine With & Without Contrast    (Results Pending)       I ordered and independently reviewed the above noted radiographic studies.        LABS:  I independently interpreted all laboratory studies conducted during this ED visit.  The results of these studies can be seen below and my independent interpretation in the ED course      EMERGENCY DEPARTMENT COURSE and DIFFERENTIAL DIAGNOSIS/MDM:   Vitals:  AS OF 05:20 EST    BP - 161/67  HR - 62  TEMP - 97.9 °F (36.6 °C) (Oral)  O2 SATS - 96%        Discussion below represents my analysis of pertinent findings related to patient's condition, differential diagnosis, treatment plan and final disposition.      Differential diagnosis:  The differential diagnosis associated with the patient's presentation includes: Acute ischemic or hemorrhagic stroke, TIA, peripheral neuropathy, electrolyte derangement, demyelinating disease      Independent interpretations (ECG/rhythm strip/X-ray/US/CT scan): See ED course      Additional sources:  Discussed/obtained information from independent historians:   [x] Spouse: Patient's wife provides some details of HPI   [] Parent:   [] Friend:   [] EMS:   [] Other:    External record review:  5/15/2024 reviewed outpatient radiation oncology note, routine follow-up for localized prostate cancer      Patient's care impacted by:   [x] Diabetes   [] Hypertension   [] Coronary Artery Disease   [] Cancer   [] Other:     Care significantly affected by Social Determinants of Health (housing and economic circumstances, unemployment)    [] Yes     [] No   If yes, Patient's care significantly limited by  Social Determinants of Health including:    [] Inadequate housing    [] Low income    [] Alcoholism and drug addiction in family    [] Problems related to primary support group    [] Unemployment    [] Problems related to employment    [] Other Social Determinants of Health:     I considered prescription management with:     [] Pain medication:   [] Antiviral:   [] Antibiotic:   [] Other:    Additional orders considered but not ordered:  The following testing was considered but ultimately not selected: N/A    ED Course:    ED Course as of 02/03/25 0520   Sun Feb 02, 2025   2240 Twelve-lead ECG independently interpreted by myself demonstrates normal sinus rhythm with a rate of 61, there is a first-degree AV block with a TX interval of 256, there is no ST segment elevation or depression.  There is a normal axis.  T wave inversions in the inferior leads III and aVF [KB]   Mon Feb 03, 2025   0007 Laboratory workup independently interpreted by myself demonstrates mild hypercalcemia without other substantial abnormality [KB]   0038 CT scan of the brain independently interpreted by myself demonstrates no acute intracranial abnormality [KB]   0520 Chest radiograph independently interpreted by myself demonstrates no acute cardiopulmonary abnormalities.   [KB]      ED Course User Index  [KB] Kwame Hartman MD         Diagnostic lab and imaging studies were conducted.  stroke navigator consulted.  Patient is not a candidate for TNK given nondisabling symptoms.  He is not a candidate for thrombectomy.    Patient remained hemodynamically stable through his ER course without progressive neurologic symptoms.  He will be admitted for further evaluation for acute ischemic stroke      PROCEDURES:  Procedures    CRITICAL CARE TIME        CONSULTS   Stroke navigator consulted for management of suspected ischemic stroke  Hospital medicine consulted for admission    FINAL IMPRESSION      1. Left arm numbness    2. Numbness and tingling of left side of face          DISPOSITION/PLAN     ED Disposition       ED Disposition   Decision to Admit    Condition   --    Comment   Level of Care: Telemetry [5]   Diagnosis: TIA (transient ischemic attack) [128129]   Admitting Physician: GEORGIANA CLIFFORD [[id number]]   Attending Physician: GEORGIANA CLIFFORD [[id number]]                    Comment: Please note this report has been produced using speech recognition software.      Kwame Hartman MD  Attending Emergency Physician    Recent Results (from the past 24 hours)   ECG 12 Lead Stroke Evaluation    Collection Time: 02/02/25 10:34 PM   Result Value Ref Range    QT Interval 420 ms    QTC Interval 422 ms   Protime-INR    Collection Time: 02/02/25 10:43 PM    Specimen: Blood   Result Value Ref Range    Protime 12.7 12.2 - 14.5 Seconds    INR 0.95 0.89 - 1.12   aPTT    Collection Time: 02/02/25 10:43 PM    Specimen: Blood   Result Value Ref Range    PTT 33.2 22.0 - 39.0 seconds   Green Top (Gel)    Collection Time: 02/02/25 10:43 PM   Result Value Ref Range    Extra Tube Hold for add-ons.    Lavender Top    Collection Time: 02/02/25 10:43 PM   Result Value Ref Range    Extra Tube hold for add-on    Gold Top - SST    Collection Time: 02/02/25 10:43 PM   Result Value Ref Range    Extra Tube Hold for add-ons.    Gray Top    Collection Time: 02/02/25 10:43 PM   Result Value Ref Range    Extra Tube Hold for add-ons.    Light Blue Top    Collection Time: 02/02/25 10:43 PM   Result Value Ref Range    Extra Tube Hold for add-ons.    CBC Auto Differential    Collection Time: 02/02/25 10:43 PM    Specimen: Blood   Result Value Ref Range    WBC 6.29 3.40 - 10.80 10*3/mm3    RBC 4.29 4.14 - 5.80 10*6/mm3    Hemoglobin 13.4 13.0 - 17.7 g/dL    Hematocrit 39.9 37.5 - 51.0 %    MCV 93.0 79.0 - 97.0 fL    MCH 31.2 26.6 - 33.0 pg    MCHC 33.6 31.5 - 35.7 g/dL    RDW 12.2 (L) 12.3 - 15.4 %    RDW-SD 41.4 37.0 - 54.0 fl    MPV 8.9 6.0 - 12.0 fL    Platelets 274 140 - 450 10*3/mm3    Neutrophil % 65.7 42.7 - 76.0 %    Lymphocyte % 19.2 (L) 19.6 - 45.3 %    Monocyte % 10.5 5.0 - 12.0 %    Eosinophil % 3.3 0.3 - 6.2 %    Basophil % 0.8 0.0 - 1.5 %    Immature Grans % 0.5 0.0 - 0.5 %    Neutrophils, Absolute 4.13 1.70 - 7.00 10*3/mm3    Lymphocytes, Absolute 1.21 0.70 - 3.10 10*3/mm3    Monocytes, Absolute 0.66  0.10 - 0.90 10*3/mm3    Eosinophils, Absolute 0.21 0.00 - 0.40 10*3/mm3    Basophils, Absolute 0.05 0.00 - 0.20 10*3/mm3    Immature Grans, Absolute 0.03 0.00 - 0.05 10*3/mm3    nRBC 0.0 0.0 - 0.2 /100 WBC   Comprehensive Metabolic Panel    Collection Time: 02/02/25 10:43 PM    Specimen: Blood   Result Value Ref Range    Glucose 117 (H) 65 - 99 mg/dL    BUN 31 (H) 8 - 23 mg/dL    Creatinine 1.00 0.76 - 1.27 mg/dL    Sodium 144 136 - 145 mmol/L    Potassium 4.1 3.5 - 5.2 mmol/L    Chloride 104 98 - 107 mmol/L    CO2 29.0 22.0 - 29.0 mmol/L    Calcium 10.2 (H) 8.2 - 9.6 mg/dL    Total Protein 6.9 6.0 - 8.5 g/dL    Albumin 4.2 3.5 - 5.2 g/dL    ALT (SGPT) 21 1 - 41 U/L    AST (SGOT) 23 1 - 40 U/L    Alkaline Phosphatase 78 39 - 117 U/L    Total Bilirubin 0.2 0.0 - 1.2 mg/dL    Globulin 2.7 gm/dL    A/G Ratio 1.6 g/dL    BUN/Creatinine Ratio 31.0 (H) 7.0 - 25.0    Anion Gap 11.0 5.0 - 15.0 mmol/L    eGFR 71.1 >60.0 mL/min/1.73     Note: In addition to lab results from this visit, the labs listed above may include labs taken at another facility or during a different encounter within the last 24 hours. Please correlate lab times with ED admission and discharge times for further clarification of the services performed during this visit.                 Kwame Hartman MD  02/03/25 0517

## 2025-02-03 NOTE — PROGRESS NOTES
Stroke Progress Note       Chief Complaint: Left-sided paresthesias    Subjective    Subjective     Subjective:  Patient is doing okay this morning    Objective      Temp:  [97.9 °F (36.6 °C)] 97.9 °F (36.6 °C)  Heart Rate:  [51-71] 51  Resp:  [16-18] 16  BP: (137-179)/() 171/77        MENTAL STATUS: AAOx3, memory intact, fund of knowledge appropriate    LANG/SPEECH: Naming and repetition intact, fluent, follows 3-step commands    CRANIAL NERVES:    Pupils equal and reactive, EOMI intact, no gaze deviation, no nystagmus  No facial droop, cough and gag intact, shoulder shrug intact, tongue midline     MOTOR:  Moves all extremities equally    SENSORY: Normal to light touch all throughout     COORDINATION: Normal finger to nose and heel to shin, no tremor, no dysmetria    STATION: Not assessed due to patient condition    GAIT: Not assessed due to patient condition     Results Review:    I reviewed the patient's new clinical results.    Lab Results (last 24 hours)       Procedure Component Value Units Date/Time    POC Glucose Once [[id number]]  (Normal) Collected: 02/03/25 1221    Specimen: Blood Updated: 02/03/25 1222     Glucose 100 mg/dL     Hemoglobin A1c [[id number]]  (Normal) Collected: 02/03/25 0752    Specimen: Blood Updated: 02/03/25 0934     Hemoglobin A1C 5.40 %     Narrative:      Hemoglobin A1C Ranges:    Increased Risk for Diabetes  5.7% to 6.4%  Diabetes                     >= 6.5%  Diabetic Goal                < 7.0%    Lipid Panel [[id number]] Collected: 02/03/25 0741    Specimen: Blood from Arm, Right Updated: 02/03/25 0813     Total Cholesterol 152 mg/dL      Triglycerides 107 mg/dL      HDL Cholesterol 53 mg/dL      LDL Cholesterol  80 mg/dL      VLDL Cholesterol 19 mg/dL      LDL/HDL Ratio 1.46    Narrative:      Cholesterol Reference Ranges  (U.S. Department of Health and Human Services ATP III Classifications)    Desirable          <200 mg/dL  Borderline High    200-239 mg/dL  High Risk           >240 mg/dL      Triglyceride Reference Ranges  (U.S. Department of Health and Human Services ATP III Classifications)    Normal           <150 mg/dL  Borderline High  150-199 mg/dL  High             200-499 mg/dL  Very High        >500 mg/dL    HDL Reference Ranges  (U.S. Department of Health and Human Services ATP III Classifications)    Low     <40 mg/dl (major risk factor for CHD)  High    >60 mg/dl ('negative' risk factor for CHD)        LDL Reference Ranges  (U.S. Department of Health and Human Services ATP III Classifications)    Optimal          <100 mg/dL  Near Optimal     100-129 mg/dL  Borderline High  130-159 mg/dL  High             160-189 mg/dL  Very High        >189 mg/dL    LDL is calculated using the NIH LDL-C calculation.      Basic Metabolic Panel [[id number]]  (Abnormal) Collected: 02/03/25 0741    Specimen: Blood from Arm, Right Updated: 02/03/25 0813     Glucose 112 mg/dL      BUN 28 mg/dL      Creatinine 0.88 mg/dL      Sodium 139 mmol/L      Potassium 3.7 mmol/L      Chloride 103 mmol/L      CO2 27.0 mmol/L      Calcium 9.7 mg/dL      BUN/Creatinine Ratio 31.8     Anion Gap 9.0 mmol/L      eGFR 81.2 mL/min/1.73     Narrative:      GFR Categories in Chronic Kidney Disease (CKD)      GFR Category          GFR (mL/min/1.73)    Interpretation  G1                     90 or greater         Normal or high (1)  G2                      60-89                Mild decrease (1)  G3a                   45-59                Mild to moderate decrease  G3b                   30-44                Moderate to severe decrease  G4                    15-29                Severe decrease  G5                    14 or less           Kidney failure          (1)In the absence of evidence of kidney disease, neither GFR category G1 or G2 fulfill the criteria for CKD.    eGFR calculation 2021 CKD-EPI creatinine equation, which does not include race as a factor    CBC (No Diff) [[id number]]  (Normal) Collected: 02/03/25 0753     Specimen: Blood Updated: 02/03/25 0800     WBC 6.46 10*3/mm3      RBC 4.28 10*6/mm3      Hemoglobin 13.2 g/dL      Hematocrit 40.3 %      MCV 94.2 fL      MCH 30.8 pg      MCHC 32.8 g/dL      RDW 12.3 %      RDW-SD 42.3 fl      MPV 8.9 fL      Platelets 261 10*3/mm3     Comprehensive Metabolic Panel [[id number]]  (Abnormal) Collected: 02/02/25 2243    Specimen: Blood Updated: 02/02/25 2337     Glucose 117 mg/dL      BUN 31 mg/dL      Creatinine 1.00 mg/dL      Sodium 144 mmol/L      Potassium 4.1 mmol/L      Comment: Specimen hemolyzed.  Result may be falsely elevated.        Chloride 104 mmol/L      CO2 29.0 mmol/L      Calcium 10.2 mg/dL      Total Protein 6.9 g/dL      Albumin 4.2 g/dL      ALT (SGPT) 21 U/L      Comment: Specimen hemolyzed.  Result may  be falsely elevated.        AST (SGOT) 23 U/L      Alkaline Phosphatase 78 U/L      Total Bilirubin 0.2 mg/dL      Globulin 2.7 gm/dL      Comment: Calculated Result        A/G Ratio 1.6 g/dL      BUN/Creatinine Ratio 31.0     Anion Gap 11.0 mmol/L      eGFR 71.1 mL/min/1.73     Narrative:      GFR Categories in Chronic Kidney Disease (CKD)      GFR Category          GFR (mL/min/1.73)    Interpretation  G1                     90 or greater         Normal or high (1)  G2                      60-89                Mild decrease (1)  G3a                   45-59                Mild to moderate decrease  G3b                   30-44                Moderate to severe decrease  G4                    15-29                Severe decrease  G5                    14 or less           Kidney failure          (1)In the absence of evidence of kidney disease, neither GFR category G1 or G2 fulfill the criteria for CKD.    eGFR calculation 2021 CKD-EPI creatinine equation, which does not include race as a factor    Protime-INR [[id number]]  (Normal) Collected: 02/02/25 2243    Specimen: Blood Updated: 02/02/25 2325     Protime 12.7 Seconds      INR 0.95    aPTT [[id number]]   (Normal) Collected: 02/02/25 2243    Specimen: Blood Updated: 02/02/25 2325     PTT 33.2 seconds     Narrative:      PTT = The equivalent PTT values for the therapeutic range of heparin levels at 0.3 to 0.5 U/ml are 60 to 70 seconds.    [place] Draw [[id number]] Collected: 02/02/25 2243    Specimen: Blood Updated: 02/02/25 2315    Narrative:      The following orders were created for panel order [place] Draw.  Procedure                               Abnormality         Status                     ---------                               -----------         ------                     Green Top (Gel)[[id number]]                                  Final result               Lavender Top[[id number]]                                     Final result               Gold Top - SST[[id number]]                                   Final result               Juan Top[[id number]]                                         Final result               Light Blue Top[[id number]]                                   Final result                 Please view results for these tests on the individual orders.    Lavender Top [[id number]] Collected: 02/02/25 2243    Specimen: Blood Updated: 02/02/25 2315     Extra Tube hold for add-on     Comment: Auto resulted       Light Blue Top [[id number]] Collected: 02/02/25 2243    Specimen: Blood Updated: 02/02/25 2315     Extra Tube Hold for add-ons.     Comment: Auto resulted       CBC & Differential [[id number]]  (Abnormal) Collected: 02/02/25 2243    Specimen: Blood Updated: 02/02/25 6249    Narrative:      The following orders were created for panel order CBC & Differential.  Procedure                               Abnormality         Status                     ---------                               -----------         ------                     CBC Auto Differential[[id number]]        Abnormal            Final result                 Please view results for these tests on the individual orders.    CBC Auto  Differential [[id number]]  (Abnormal) Collected: 02/02/25 2243    Specimen: Blood Updated: 02/02/25 2309     WBC 6.29 10*3/mm3      RBC 4.29 10*6/mm3      Hemoglobin 13.4 g/dL      Hematocrit 39.9 %      MCV 93.0 fL      MCH 31.2 pg      MCHC 33.6 g/dL      RDW 12.2 %      RDW-SD 41.4 fl      MPV 8.9 fL      Platelets 274 10*3/mm3      Neutrophil % 65.7 %      Lymphocyte % 19.2 %      Monocyte % 10.5 %      Eosinophil % 3.3 %      Basophil % 0.8 %      Immature Grans % 0.5 %      Neutrophils, Absolute 4.13 10*3/mm3      Lymphocytes, Absolute 1.21 10*3/mm3      Monocytes, Absolute 0.66 10*3/mm3      Eosinophils, Absolute 0.21 10*3/mm3      Basophils, Absolute 0.05 10*3/mm3      Immature Grans, Absolute 0.03 10*3/mm3      nRBC 0.0 /100 WBC     Green Top (Gel) [[id number]] Collected: 02/02/25 2243    Specimen: Blood Updated: 02/02/25 2301     Extra Tube Hold for add-ons.     Comment: Auto resulted.       Gold Top - SST [[id number]] Collected: 02/02/25 2243    Specimen: Blood Updated: 02/02/25 2301     Extra Tube Hold for add-ons.     Comment: Auto resulted.       Juan Top [[id number]] Collected: 02/02/25 2243    Specimen: Blood Updated: 02/02/25 2301     Extra Tube Hold for add-ons.     Comment: Auto resulted.             MRI Cervical Spine With & Without Contrast    Result Date: 2/3/2025  Impression: 1.Moderate cervical spondylosis with varying degrees of neuroforaminal narrowing and mild narrowing of the spinal canal at multiple levels. 2.No acute osseous abnormality. 3.Heterogeneous nodular right thyroid lobe. This could be further evaluated with ultrasound. Electronically Signed: Rolando Flores MD  2/3/2025 6:26 AM EST  Workstation ID: [id number]    MRI Brain Without Contrast    Result Date: 2/3/2025  Impression: 1.No acute intracranial abnormality. 2.Mild chronic small vessel ischemic change. Electronically Signed: Rolando Flores MD  2/3/2025 5:51 AM EST  Workstation ID: [id number]    CT Angiogram Head w AI Analysis of  LVO    Result Date: 2/3/2025  Impression: 1.No evidence of large vessel occlusion. 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 A moderate grade stenosis in a left M2 branch. There are high-grade stenoses in the distal PCA branches bilaterally. A focal high-grade stenosis in the pericallosal right LISA. 3.There is moderate focal stenosis at the right vertebral artery origin and there is moderate focal stenosis in the mid right V4 segment. 4.Mild nonstenosing plaque in the carotid arteries. 5.Cervical spondylosis. Electronically Signed: Rolando Flores MD  2/3/2025 12:42 AM EST  Workstation ID: [id number]    CT Angiogram Neck    Result Date: 2/3/2025  Impression: 1.No evidence of large vessel occlusion. 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 A moderate grade stenosis in a left M2 branch. There are high-grade stenoses in the distal PCA branches bilaterally. A focal high-grade stenosis in the pericallosal right LISA. 3.There is moderate focal stenosis at the right vertebral artery origin and there is moderate focal stenosis in the mid right V4 segment. 4.Mild nonstenosing plaque in the carotid arteries. 5.Cervical spondylosis. Electronically Signed: Rolando Flores MD  2/3/2025 12:42 AM EST  Workstation ID: [id number]    CT CEREBRAL PERFUSION WITH & WITHOUT CONTRAST    Result Date: 2/3/2025  Impression: No significant perfusion abnormality in the brain. Electronically Signed: Rolando Flores MD  2/3/2025 12:30 AM EST  Workstation ID: [id number]    CT Head Without Contrast Stroke Protocol    Result Date: 2/3/2025  Impression: 1.No acute intracranial abnormality. 2.Mild chronic small vessel ischemic change. Electronically Signed: Rolando Flores MD  2/3/2025 12:08 AM EST  Workstation ID: [id number]    XR Chest 1 View    Result Date: 2/2/2025  Impression: No acute cardiopulmonary abnormality. Electronically Signed: Rolando Flores MD  2/2/2025 10:47 PM EST  Workstation ID: [id number]    Results for orders placed during the hospital encounter of 02/02/25    Adult Transthoracic Echo Complete W/ Cont if Necessary Per Protocol (With Agitated Saline)    Interpretation Summary    Left ventricular ejection fraction appears to be 56 - 60%.    Left ventricular wall thickness is consistent with mild concentric hypertrophy.    Left ventricular diastolic function is consistent with age.    There is calcification of the aortic valve.    There is posterior mitral leaflet thickening present.    Estimated right ventricular systolic pressure from tricuspid regurgitation is normal (<35 mmHg). Calculated right ventricular systolic pressure from tricuspid regurgitation is 15 mmHg.            Assessment/Plan     Assessment/Plan:    Transient ischemic attack likely localizable to the right cerebral hemisphere.  -CTA shows diffuse iCAD.  -Aspirin 81, Plavix 75 for 21 days followed by aspirin 81 daily.  -Normal blood pressure goals  -PT OT speech can continue to work with patient  -Holter monitor at discharge    Could be a candidate for acute inpatient rehab    Stroke team and continue to follow the patient.    Nico Issa MD  02/03/25  13:35 EST

## 2025-02-03 NOTE — CASE MANAGEMENT/SOCIAL WORK
Discharge Planning Assessment  [facility]     Patient Name: Juancho Smith Jr.  MRN: [id number]  Today's Date: 2/3/2025    Admit Date: 2/2/2025    Plan: IDP   Discharge Needs Assessment       Row Name 02/03/25 1018       Living Environment    People in Home spouse    Current Living Arrangements home    Primary Care Provided by self;spouse/significant other    Provides Primary Care For no one    Family Caregiver if Needed spouse;other relative(s)    Quality of Family Relationships involved;helpful    Able to Return to Prior Arrangements yes       Transition Planning    Transportation Anticipated family or friend will provide       Discharge Needs Assessment    Readmission Within the Last 30 Days no previous admission in last 30 days    Equipment Currently Used at Home cane, straight    Concerns to be Addressed denies needs/concerns at this time                   Discharge Plan       Row Name 02/03/25 1018       Plan    Plan IDP    Plan Comments MSW met with Pt and family at bedside to complete IDP. Pt lives with spouse in [facility]. Pt’s wife confirmed demographics and reports PCP is Dr. Saavedra. Pt has [employer] Medicare insurance coverage. Pt moderate with ADLs; Pt has cane PRN, no HH services, and no O2. Pt’s preferred pharmacy is [facility]. Pt does not drive. Pt’s wife can transport when medically ready. MSW provided home Sitter list and [facility] Sarmiento information. CM will continue to follow.                  Continued Care and Services - Admitted Since 2/2/2025    No active coordination exists for this encounter.          Demographic Summary       Row Name 02/03/25 1017       General Information    Arrived From home    Referral Source admission list;emergency department    Reason for Consult discharge planning    Preferred Language English                   Functional Status       Row Name 02/03/25 1018       Functional Status    Usual Activity Tolerance good    Current Activity Tolerance moderate       Functional  Status, IADL    Medications independent    Meal Preparation independent    Housekeeping independent    Laundry independent    Shopping independent                               DOLORES Jay

## 2025-02-03 NOTE — CONSULTS
Order criteria not met for diabetes education consult. Current A1c is 5.4, noted during chart review. Pt has no history of DM and no home meds for DM. Thank you.

## 2025-02-03 NOTE — PLAN OF CARE
Problem: Adult Inpatient Plan of Care  Goal: Plan of Care Review  Outcome: Progressing  Flowsheets (Taken 2/3/2025 1203)  Plan of Care Reviewed With:   patient   spouse   other (see comments)   Goal Outcome Evaluation:  Plan of Care Reviewed With: patient, spouse, other (see comments)      SLP evaluation completed. Will sign-off. Please see note for further details and recommendations.            Anticipated Discharge Disposition (SLP): home    SLP Diagnosis: functional speech/language skills, functional cognitive-linguistic skills (02/03/25 1100)

## 2025-02-03 NOTE — THERAPY EVALUATION
Acute Care - Speech Language Pathology Initial Evaluation  [facility]  Cognitive-Communication Evaluation       Patient Name: Juancho Smith Jr.  : 1933  MRN: [id number]  Today's Date: 2/3/2025               Admit Date: 2025     Visit Dx:    ICD-10-CM ICD-9-CM   1. Left arm numbness  R20.0 782.0   2. Numbness and tingling of left side of face  R20.0 782.0    R20.2      Patient Active Problem List   Diagnosis    Prostate cancer    TIA (transient ischemic attack)     Past Medical History:   Diagnosis Date    Diabetes mellitus     Disease of thyroid gland     Glaucoma     Prostate cancer      Past Surgical History:   Procedure Laterality Date    CATARACT EXTRACTION      CHOLECYSTECTOMY      COLONOSCOPY      last one at 82    CYBERKNIFE  2022    prostate/SV    PARATHYROID GLAND SURGERY      PROSTATE BIOPSY      PROSTATE FIDUCIAL MARKER PLACEMENT  2022    THYROID SURGERY         SLP Recommendation and Plan  SLP Diagnosis: functional speech/language skills, functional cognitive-linguistic skills (25 1100)              SLC Criteria for Skilled Therapy Interventions Met: no problems identified which require skilled intervention (25 1100)  Anticipated Discharge Disposition (SLP): home (25 1100)        Therapy Frequency (SLP SLC): evaluation only (25 1100)                                       SLP EVALUATION (Last 72 Hours)       SLP SLC Evaluation       Row Name 25                   Communication Assessment/Intervention    Document Type evaluation  -DV        Subjective Information no complaints  -DV        Patient Observations alert;cooperative  -DV        Patient/Family/Caregiver Comments/Observations wife and other friend/family present  -DV        Patient Effort excellent  -DV        Symptoms Noted During/After Treatment none  -DV           General Information    Patient Profile Reviewed yes  -DV        Pertinent History Of Current Problem 89yoM adm w/ L face and  arm parasthesias. CT/MRI negative for acute findings. Pt/family denied any noted difficulty with communication or cognition.  -DV        Precautions/Limitations, Vision WFL with corrective lenses;for purposes of eval  -DV        Precautions/Limitations, Hearing hearing impairment, bilaterally;hearing aid, bilaterally  -DV        Prior Level of Function-Communication WFL  -DV        Plans/Goals Discussed with patient;spouse/S.O.;family;agreed upon  -DV        Barriers to Rehab none identified  -DV        Patient's Goals for Discharge return to home  -DV        Family Goals for Discharge family did not state  -DV           Pain    Pretreatment Pain Rating 0/10 - no pain  -DV        Posttreatment Pain Rating 0/10 - no pain  -DV           Comprehension Assessment/Intervention    Comprehension Assessment/Intervention Auditory Comprehension  -DV           Auditory Comprehension Assessment/Intervention    Auditory Comprehension (Communication) WFL  -DV        Answers Questions (Communication) yes/no;wh questions;WFL  -DV        Able to Follow Commands (Communication) 3-step;WFL  -DV        Narrative Discourse conversational level;WFL  -DV           Expression Assessment/Intervention    Expression Assessment/Intervention verbal expression  -DV           Verbal Expression Assessment/Intervention    Verbal Expression WFL  -DV        Responsive Naming WFL  -DV        Spontaneous/Functional Words WFL  -DV        Sentence Formulation complex;WFL  -DV        Conversational Discourse/Fluency WFL  -DV           Motor Speech Assessment/Intervention    Motor Speech Function WFL  -DV           Cursory Voice Assessment/Intervention    Quality and Resonance (Voice) WFL  -DV           Cognitive Assessment Intervention- SLP    Cognitive Function (Cognition) WFL  -DV        Orientation Status (Cognition) awareness of basic personal information;person;place;time;situation;WFL  -DV        Memory (Cognitive) short-term;unrelated;WFL  -DV         Attention (Cognitive) WFL  -DV        Thought Organization (Cognitive) concrete divergent;WFL  -DV        Reasoning (Cognitive) mental flexibility;WFL  -DV        Problem Solving (Cognitive) temporal;WFL  -DV        Pragmatics (Communication) WFL  -DV           SLP Evaluation Clinical Impressions    SLP Diagnosis functional speech/language skills;functional cognitive-linguistic skills  -DV        SLC Criteria for Skilled Therapy Interventions Met no problems identified which require skilled intervention  -DV        Functional Impact no impact on function  -DV           Recommendations    Therapy Frequency (SLP SLC) evaluation only  -DV        Anticipated Discharge Disposition (SLP) home  -DV                  User Key  (r) = Recorded By, (t) = Taken By, (c) = Cosigned By      Initials Name Effective Dates    DV Jeri Bond MS CCC-SLP 06/16/21 -                        EDUCATION  The patient has been educated in the following areas:     Cognitive Impairment Communication Impairment.                        Time Calculation:      Time Calculation- SLP       Row Name 02/03/25 1204             Time Calculation- SLP    SLP Start Time 1100  -DV      SLP Received On 02/03/25  -DV         Untimed Charges    SLP Eval/Re-eval  ST Eval Speech and Production w/ Language - 22690  -DV      29810-EC Eval Speech and Production w/ Language Minutes 25  -DV         Total Minutes    Untimed Charges Total Minutes 25  -DV       Total Minutes 25  -DV                User Key  (r) = Recorded By, (t) = Taken By, (c) = Cosigned By      Initials Name Provider Type    DV Jeri Bond MS CCC-SLP Speech and Language Pathologist                    Therapy Charges for Today       Code Description Service Date Service Provider Modifiers Qty    80577047968 HC ST EVAL SPEECH AND PROD W LANG  2 2/3/2025 Jeri Bond MS CCC-SLP GN 1                       Jeri Bond MS CCC-ТАТЬЯНА  2/3/2025

## 2025-02-03 NOTE — ED NOTES
Juancho Smith Jr.    Nursing Report ED to Floor:  Mental status: AOX4  Ambulatory status: ASSIST X 1  Oxygen Therapy:  RA  Cardiac Rhythm: NSR  Admitted from: HOME  Safety Concerns:  FALL RISK  Precautions: NA  Social Issues: NA  ED Room #:  19    ED Nurse Phone Extension - [phone] or may call [phone].      HPI:   Chief Complaint   Patient presents with    Tingling       Past Medical History:  Past Medical History:   Diagnosis Date    Diabetes mellitus     Disease of thyroid gland     Glaucoma     Prostate cancer         Past Surgical History:  Past Surgical History:   Procedure Laterality Date    CATARACT EXTRACTION      CHOLECYSTECTOMY      COLONOSCOPY      last one at 82    CYBERKNIFE  09/30/2022    prostate/SV    PARATHYROID GLAND SURGERY      PROSTATE BIOPSY      PROSTATE FIDUCIAL MARKER PLACEMENT  08/19/2022    THYROID SURGERY          Admitting Doctor:   Katie Bustillo MD    Consulting Provider(s):  Consults       Date and Time Order Name Status Description    2/2/2025  9:33 PM Inpatient Neurology Consult Stroke Completed              Admitting Diagnosis:   The primary encounter diagnosis was Left arm numbness. A diagnosis of Numbness and tingling of left side of face was also pertinent to this visit.    Most Recent Vitals:   Vitals:    02/03/25 1150 02/03/25 1200 02/03/25 1221 02/03/25 1400   BP:  171/77  151/83   Pulse: 60 51  62   Resp:   16 16   Temp:       TempSrc:       SpO2: 96% 95%  94%   Weight:       Height:           Active LDAs/IV Access:   Lines, Drains & Airways       Active LDAs       Name Placement date Placement time Site Days    Peripheral IV 02/02/25 2104 Anterior;Distal;Left;Upper Arm 02/02/25 2104  Arm  less than 1    Peripheral IV 02/02/25 2253 Anterior;Left Forearm 02/02/25 2253  Forearm  less than 1                    Labs (abnormal labs have a star):   Labs Reviewed   CBC WITH AUTO DIFFERENTIAL - Abnormal; Notable for the following components:       Result Value    RDW 12.2 (*)      Lymphocyte % 19.2 (*)     All other components within normal limits   COMPREHENSIVE METABOLIC PANEL - Abnormal; Notable for the following components:    Glucose 117 (*)     BUN 31 (*)     Calcium 10.2 (*)     BUN/Creatinine Ratio 31.0 (*)     All other components within normal limits    Narrative:     GFR Categories in Chronic Kidney Disease (CKD)      GFR Category          GFR (mL/min/1.73)    Interpretation  G1                     90 or greater         Normal or high (1)  G2                      60-89                Mild decrease (1)  G3a                   45-59                Mild to moderate decrease  G3b                   30-44                Moderate to severe decrease  G4                    15-29                Severe decrease  G5                    14 or less           Kidney failure          (1)In the absence of evidence of kidney disease, neither GFR category G1 or G2 fulfill the criteria for CKD.    eGFR calculation 2021 CKD-EPI creatinine equation, which does not include race as a factor   BASIC METABOLIC PANEL - Abnormal; Notable for the following components:    Glucose 112 (*)     BUN 28 (*)     Calcium 9.7 (*)     BUN/Creatinine Ratio 31.8 (*)     All other components within normal limits    Narrative:     GFR Categories in Chronic Kidney Disease (CKD)      GFR Category          GFR (mL/min/1.73)    Interpretation  G1                     90 or greater         Normal or high (1)  G2                      60-89                Mild decrease (1)  G3a                   45-59                Mild to moderate decrease  G3b                   30-44                Moderate to severe decrease  G4                    15-29                Severe decrease  G5                    14 or less           Kidney failure          (1)In the absence of evidence of kidney disease, neither GFR category G1 or G2 fulfill the criteria for CKD.    eGFR calculation 2021 CKD-EPI creatinine equation, which does not include race as a  factor   PROTIME-INR - Normal   APTT - Normal    Narrative:     PTT = The equivalent PTT values for the therapeutic range of heparin levels at 0.3 to 0.5 U/ml are 60 to 70 seconds.   HEMOGLOBIN A1C - Normal    Narrative:     Hemoglobin A1C Ranges:    Increased Risk for Diabetes  5.7% to 6.4%  Diabetes                     >= 6.5%  Diabetic Goal                < 7.0%   CBC (NO DIFF) - Normal   POCT GLUCOSE FINGERSTICK - Normal   RAINBOW DRAW    Narrative:     The following orders were created for panel order [place] Draw.  Procedure                               Abnormality         Status                     ---------                               -----------         ------                     Green Top (Gel)[[id number]]                                  Final result               Lavender Top[[id number]]                                     Final result               Gold Top - SST[[id number]]                                   Final result               Juan Top[[id number]]                                         Final result               Light Blue Top[[id number]]                                   Final result                 Please view results for these tests on the individual orders.   LIPID PANEL    Narrative:     Cholesterol Reference Ranges  (U.S. Department of Health and Human Services ATP III Classifications)    Desirable          <200 mg/dL  Borderline High    200-239 mg/dL  High Risk          >240 mg/dL      Triglyceride Reference Ranges  (U.S. Department of Health and Human Services ATP III Classifications)    Normal           <150 mg/dL  Borderline High  150-199 mg/dL  High             200-499 mg/dL  Very High        >500 mg/dL    HDL Reference Ranges  (U.S. Department of Health and Human Services ATP III Classifications)    Low     <40 mg/dl (major risk factor for CHD)  High    >60 mg/dl ('negative' risk factor for CHD)        LDL Reference Ranges  (U.S. Department of Health and Human Services ATP III  Classifications)    Optimal          <100 mg/dL  Near Optimal     100-129 mg/dL  Borderline High  130-159 mg/dL  High             160-189 mg/dL  Very High        >189 mg/dL    LDL is calculated using the NIH LDL-C calculation.     POCT GLUCOSE FINGERSTICK   POCT GLUCOSE FINGERSTICK   POCT GLUCOSE FINGERSTICK   POCT GLUCOSE FINGERSTICK   POCT GLUCOSE FINGERSTICK   POCT GLUCOSE FINGERSTICK   CBC AND DIFFERENTIAL    Narrative:     The following orders were created for panel order CBC & Differential.  Procedure                               Abnormality         Status                     ---------                               -----------         ------                     CBC Auto Differential[[id number]]        Abnormal            Final result                 Please view results for these tests on the individual orders.   GREEN TOP   LAVENDER TOP   GOLD TOP - SST   GRAY TOP   LIGHT BLUE TOP       Meds Given in ED:   Medications   sodium chloride 0.9 % flush 10 mL (has no administration in time range)   sodium chloride 0.9 % flush 10 mL (10 mL Intravenous Given 2/3/25 0808)   sodium chloride 0.9 % flush 10 mL (has no administration in time range)   sodium chloride 0.9 % infusion 40 mL (has no administration in time range)   atorvastatin (LIPITOR) tablet 80 mg (0 mg Oral Hold 2/3/25 0813)   aspirin chewable tablet 81 mg (81 mg Oral Given 2/3/25 0128)     Or   aspirin suppository 300 mg ( Rectal Not Given:  See Alt 2/3/25 0128)   clopidogrel (PLAVIX) tablet 300 mg (300 mg Oral Given 2/3/25 0128)     And   clopidogrel (PLAVIX) tablet 75 mg (has no administration in time range)   sodium chloride 0.9 % flush 10 mL (10 mL Intravenous Given 2/3/25 0808)   sodium chloride 0.9 % flush 10 mL (has no administration in time range)   sodium chloride 0.9 % infusion 40 mL (has no administration in time range)   nitroglycerin (NITROSTAT) SL tablet 0.4 mg (has no administration in time range)   Potassium Replacement - Follow Nurse / BPA  Driven Protocol (has no administration in time range)   Magnesium Standard Dose Replacement - Follow Nurse / BPA Driven Protocol (has no administration in time range)   Phosphorus Replacement - Follow Nurse / BPA Driven Protocol (has no administration in time range)   Calcium Replacement - Follow Nurse / BPA Driven Protocol (has no administration in time range)   acetaminophen (TYLENOL) tablet 500 mg (has no administration in time range)     Or   acetaminophen (TYLENOL) 160 MG/5ML oral solution 500 mg (has no administration in time range)     Or   acetaminophen (TYLENOL) suppository 650 mg (has no administration in time range)   sennosides-docusate (PERICOLACE) 8.6-50 MG per tablet 2 tablet (has no administration in time range)     And   polyethylene glycol (MIRALAX) packet 17 g (has no administration in time range)     And   bisacodyl (DULCOLAX) EC tablet 5 mg (has no administration in time range)     And   bisacodyl (DULCOLAX) suppository 10 mg (has no administration in time range)   hydroCHLOROthiazide tablet 25 mg ( Oral Dose Auto Held 2/11/25 0900)   ferrous sulfate tablet 325 mg (325 mg Oral Given 2/3/25 0806)   dorzolamide (TRUSOPT) 2 % 1 drop, timolol (TIMOPTIC) 0.5 % 1 drop for Cosopt 22.3-6.8 mg/mL ( Both Eyes Given 2/3/25 0844)   latanoprost (XALATAN) 0.005 % ophthalmic solution 1 drop (has no administration in time range)   tamsulosin (FLOMAX) 24 hr capsule 0.4 mg ( Oral Dose Auto Held 2/11/25 0900)   brimonidine (ALPHAGAN) 0.2 % ophthalmic solution 1 drop (1 drop Both Eyes Given 2/3/25 0814)   cycloSPORINE (RESTASIS) 0.05 % ophthalmic emulsion 1 drop (1 drop Both Eyes Given 2/3/25 0810)   levothyroxine (SYNTHROID, LEVOTHROID) tablet 137 mcg (137 mcg Oral Given 2/3/25 0806)   iopamidol (ISOVUE-370) 76 % injection 120 mL (120 mL Intravenous Given 2/2/25 7948)   gadobenate dimeglumine (MULTIHANCE) injection 15 mL (15 mL Intravenous Given 2/3/25 2723)           Last NIH score:  Interval: baseline  1a. Level  of Consciousness: 0-->Alert, keenly responsive  1b. LOC Questions: 0-->Answers both questions correctly  1c. LOC Commands: 0-->Performs both tasks correctly  2. Best Gaze: 0-->Normal  3. Visual: 0-->No visual loss  4. Facial Palsy: 0-->Normal symmetrical movements  5a. Motor Arm, Left: 0-->No drift, limb holds 90 (or 45) degrees for full 10 secs  5b. Motor Arm, Right: 0-->No drift, limb holds 90 (or 45) degrees for full 10 secs  6a. Motor Leg, Left: 0-->No drift, leg holds 30 degree position for full 5 secs  6b. Motor Leg, Right: 0-->No drift, leg holds 30 degree position for full 5 secs  7. Limb Ataxia: 0-->Absent  8. Sensory: 0-->Normal, no sensory loss  9. Best Language: 0-->No aphasia, normal  10. Dysarthria: 0-->Normal  11. Extinction and Inattention (formerly Neglect): 0-->No abnormality    Total (NIH Stroke Scale): 0     Dysphagia screening results:  Patient Factors Component (Dysphagia:Stroke or Rule-out)  Best Eye Response: 4-->(E4) spontaneous (02/03/25 0456)  Best Motor Response: 6-->(M6) obeys commands (02/03/25 0456)  Best Verbal Response: 5-->(V5) oriented (02/03/25 0456)  [place] Coma Scale Score: 15 (02/03/25 0456)  Is there Facial Asymmetry/Weakness?: No (02/03/25 0120)  Is there Tongue Asymmetry/Weakness?: No (02/03/25 0120)  Is there Palatal Asymmetry/Weakness?: No (02/03/25 0120)  Patient Assessment Result: Pass - Proceed to Water Test (02/03/25 0120)     [place] Coma Scale:  No data recorded     CIWA:        Restraint Type:            Isolation Status:  No active isolations

## 2025-02-03 NOTE — PROGRESS NOTES
[facility] Medicine Services  ADMISSION FOLLOW-UP NOTE          Patient admitted after midnight, H&P by my partner performed earlier on today's date reviewed.  Interim findings, labs, and charting also reviewed.        [facility] Hospital Problem List has been managed and updated to include any new diagnoses:  Active Hospital Problems    Diagnosis  POA    **TIA (transient ischemic attack) [G45.9]  Yes      Resolved Hospital Problems   No resolved problems to display.     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  Last known well 1900 on 2/2.  On my exam, patient states symptoms have significantly improved but mildly persist.  NIH is 0, no sensory deficit to light touch despite paresthesias.  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  Patient was not a candidate for IV thrombolytic therapy or emergent neurointervention due to improving symptoms and NIH of 0.     Left face and LUE paresthesias  -Concern for acute stroke versus TIA versus peripheral, symptoms are resolving  -Stroke neurology following, continue workup of protocol, MRI brain with no acute intracranial abnormality, follow-up echo  -Continue DAPT with aspirin and Plavix  -Okay to liberalize BP control  -PT/OT to evaluate     Hx of cervical spine stenosis?  -MRI C-spine shows moderate cervical spondylosis with varying degrees of neuroforaminal narrowing, no acute osseous abnormality  -PT/OT to evaluate     Glaucoma  Hypothyroidism  -Continue with home medications     Hypertension  -Okay to resume HCTZ     Otherwise continue plan of care per admission H&P    Expected Discharge   Expected discharge date/ time has not been documented.     Katie Bustillo MD  02/03/25

## 2025-02-03 NOTE — CONSULTS
Stroke Consult Note    Patient Name: Juancho Smith Jr.      MRN: [id number]  Age: 89 y.o.     Sex: male     : 1933  Primary Care Physician: Rolando Saavedra MD  Referring Physician:  Dr. 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  Last known well approximately 1900 this day.  On my exam, patient states symptoms have significantly improved but mildly persists.  NIH is 0 with no focal neurologic deficits appreciated.  Despite paresthesias, patient has no decrease sensation to light touch.  Patient denies associated neurologic symptoms to include headache, acute visual deficits, speech difficulty, unilateral weakness, gait/coordination deficits.  Code stroke CT imaging significant for multifocal high-grade stenoses in the right MCA territory, otherwise unrevealing.  Patient was deemed not a candidate for IV thrombolytic therapy or emergent neurointervention due to resolving symptoms and NIH of 0.  Patient will be admitted to the hospital medicine service for ongoing management and further workup.    Review of Systems   Constitutional:  Negative for chills and fever.   HENT:  Negative for trouble swallowing.    Eyes:  Negative for visual disturbance.   Respiratory:  Negative for shortness of breath.    Cardiovascular:  Negative for chest pain.   Gastrointestinal:  Negative for abdominal pain.   Musculoskeletal:  Negative for myalgias.   Neurological:  Positive for numbness. Negative for tremors, facial asymmetry, speech difficulty, weakness, light-headedness and  headaches.   Psychiatric/Behavioral:  Negative for behavioral problems. The patient is not nervous/anxious.       Objective  Neurological Exam  Mental Status  Alert. Oriented to person, place, time and situation. Speech is normal. Language is fluent with no aphasia. Attention and concentration are normal. Fund of knowledge is appropriate for level of education.    Cranial Nerves  CN II: Visual fields full to confrontation.  CN III, IV, VI: Extraocular movements intact bilaterally. Pupils equal round and reactive to light bilaterally.  CN V:  Right: Facial sensation is normal.  Left: Facial sensation is normal on the left.  CN VII:  Right: There is no facial weakness.  Left: There is no facial weakness.  CN VIII: Hearing grossly intact bilaterally.  CN IX, X: Palate elevates symmetrically  CN XII: Tongue midline without atrophy or fasciculations.    Motor  Normal muscle bulk throughout. Normal muscle tone. Strength is 5/5 throughout all four extremities.    Sensory  Light touch is normal in upper and lower extremities.     Coordination  Right: Finger-to-nose normal. Rapid alternating movement normal.Left: Finger-to-nose normal. Rapid alternating movement normal.    Physical Exam  Constitutional:       General: He is not in acute distress.  HENT:      Head: Normocephalic and atraumatic.   Eyes:      Extraocular Movements: Extraocular movements intact.      Pupils: Pupils are equal, round, and reactive to light.   Cardiovascular:      Rate and Rhythm: Normal rate.   Pulmonary:      Effort: Pulmonary effort is normal.   Musculoskeletal:         General: No swelling.   Skin:     General: Skin is warm and dry.   Neurological:      Mental Status: He is alert.      Motor: Motor strength is normal.  Psychiatric:         Mood and Affect: Mood normal.         Speech: Speech normal.         Behavior: Behavior normal.     Temp:  [97.9 °F (36.6 °C)] 97.9 °F (36.6 °C)  Heart Rate:  [71] 71  Resp:  [18] 18  BP: (179)/(82)  179    Past Medical History:   Diagnosis Date    Diabetes mellitus     Disease of thyroid gland     Glaucoma     Prostate cancer      Past Surgical History:   Procedure Laterality Date    CATARACT EXTRACTION      CHOLECYSTECTOMY      COLONOSCOPY      last one at 82    CYBERKNIFE  2022    prostate/SV    PARATHYROID GLAND SURGERY      PROSTATE BIOPSY      PROSTATE FIDUCIAL MARKER PLACEMENT  2022    THYROID SURGERY       Family History   Problem Relation Age of Onset    Pancreatitis Mother     Lung cancer Mother     Cancer Sister     Prader-Willi syndrome Sister      Social History     Socioeconomic History    Marital status:    Tobacco Use    Smoking status: Former     Current packs/day: 0.00     Types: Cigarettes     Quit date:      Years since quittin.1    Smokeless tobacco: Never   Vaping Use    Vaping status: Never Used   Substance and Sexual Activity    Alcohol use: Yes     Comment: 5 per week wine or bourbon    Drug use: Never    Sexual activity: Defer     No Known Allergies    Prior to Admission medications    Medication Sig Start Date End Date Taking? Authorizing Provider   brimonidine (ALPHAGAN) 0.2 % ophthalmic solution  24   Marcia Black MD   cycloSPORINE (Restasis) 0.05 % ophthalmic emulsion Restasis 0.05 % eye drops in a dropperette 22   Marcia Black MD   dorzolamide-timolol (COSOPT) 2-0.5 % ophthalmic solution     Marcia Black MD   dutasteride (AVODART) 0.5 MG capsule Take 1 capsule by mouth Daily.    Marcia Black MD   ferrous sulfate 324 (65 Fe) MG tablet delayed-release EC tablet Take 324 mg by mouth Daily With Breakfast.    Marcia Black MD   ferrous sulfate 324 MG tablet delayed-release Take 324 mg by mouth Daily.    Marcia Black MD   fluticasone (FLONASE) 50 MCG/ACT nasal spray 2 sprays by Each Nare route Daily.    Marcia Black MD   hydroCHLOROthiazide 25 MG tablet Take 1 tablet by mouth Daily.     Marcia Black MD   levothyroxine (SYNTHROID, LEVOTHROID) 137 MCG tablet Take 1 tablet by mouth Daily.    Marcia Black MD   levothyroxine (SYNTHROID, LEVOTHROID) 137 MCG tablet Take 1 tablet by mouth Daily.    Marcia Black MD   Lumigan 0.01 % ophthalmic drops Apply 1 drop to eye(s) as directed by provider. 5/11/24   Marcia Black MD   meloxicam (MOBIC) 7.5 MG tablet meloxicam 7.5 mg tablet    Marcia Black MD   Misc Natural Products (OSTEO BI-FLEX ADV JOINT SHIELD PO) Take  by mouth.    Marcia Black MD   multivitamin (THERAGRAN) tablet tablet Multivitamin 50 Plus   Daily    Marcia Black MD   mupirocin (BACTROBAN) 2 % ointment mupirocin 2 % topical ointment    Marcia Black MD   naproxen sodium (ALEVE) 220 MG tablet Take 220 mg by mouth 2 (Two) Times a Day As Needed.    Marcia Black MD   omeprazole (priLOSEC) 20 MG capsule Take 20 mg by mouth Daily.    Marcia Black MD   polyethyl glycol-propyl glycol (SYSTANE) 0.4-0.3 % solution ophthalmic solution (artificial tears) Every 1 (One) Hour As Needed.    Marcia Black MD   Pseudoephedrine-Naproxen Na (ALEVE-D SINUS & COLD PO) Daily.    Marcia Black MD   tamsulosin (FLOMAX) 0.4 MG capsule 24 hr capsule Take 1 capsule by mouth Daily.    Marcia Black MD   triamcinolone (KENALOG) 0.1 % cream triamcinolone acetonide 0.1 % topical cream    Marcia Black MD     Acute Stroke Data  Alteplase (tPA) Inclusion / Exclusion Criteria  Person Administering Scale: Rodolfo Voss PA-C    Inclusion Criteria  [x]   18 years of age or greater   [x]   Onset of symptoms < 4.5 hours before beginning treatment (stroke onset = time patient was last seen well or without symptoms).   []   Diagnosis of acute ischemic stroke causing measurable disabling deficit (Complete Hemianopia, Any Aphasia, Visual or Sensory Extinction, Any weakness limiting sustained effort against gravity)    []   Any remaining deficit considered potentially disabling in view of patient and practitioner   Exclusion criteria (Do not proceed with Alteplase if any are checked under exclusion criteria)  []   Onset unknown or GREATER than 4.5 hours   []   ICH on CT/MRI   []   CT demonstrates hypodensity representing acute or subacute infarct   []   Significant head trauma or prior stroke in the previous 3 months   []   Symptoms suggestive of subarachnoid hemorrhage   []   History of un-ruptured intracranial aneurysm GREATER than 10 mm   []   Recent intracranial or intraspinal surgery within the last 3 months   []   Arterial puncture at a non-compressible site in the previous 7 days   []   Active internal bleeding   []   Acute bleeding tendency   []   Platelet count LESS than 100,000 for known hematological diseases such as leukemia, thrombocytopenia or chronic cirrhosis   []   Current use of anticoagulant with INR GREATER than 1.7 or PT GREATER than 15 seconds, aPTT GREATER than 40 seconds   []   Heparin received within 48 hours, resulting in abnormally elevated aPTT GREATER than upper limit of normal   []   Current use of direct thrombin inhibitors or direct factor Xa inhibitors in the past 48 hours   []   Elevated blood pressure refractory to treatment (systolic GREATER than 185 mm/Hg or diastolic  GREATER than 110 mm/Hg   []   Suspected infective endocarditis and aortic arch dissection   []   Current use of therapeutic treatment dose of low-molecular-weight heparin (LMWH) within the previous 24 hours   []   Structural GI malignancy or bleed   Relative exclusion for all patients  [x]   Only minor non-disabling symptoms   []   Pregnancy   []   Seizure at onset with postictal residual neurological impairments   []   Major surgery or previous trauma within past 14 days   []   History of previous spontaneous ICH, intracranial neoplasm, or AV malformation   []   Postpartum (within previous 14 days)   []   Recent GI or urinary  tract hemorrhage (within previous 21 days)   []   Recent acute MI (within previous 3 months)   []   History of un-ruptured intracranial aneurysm LESS than 10 mm   []   History of ruptured intracranial aneurysm   []   Blood glucose LESS than 50 mg/dL (2.7 mmol/L)   []   Dural puncture within the last 7 days   []   Known GREATER than 10 cerebral microbleeds   Additional exclusions for patients with symptoms onset between 3 and 4.5 hours.  []   Age > 80.   []   On any anticoagulants regardless of INR  >>> Warfarin (Coumadin), Heparin, Enoxaparin (Lovenox), fondaparinux (Arixtra), bivalirudin (Angiomax), Argatroban, dabigatran (Pradaxa), rivaroxaban (Xarelto), or apixaban (Eliquis)   []   Severe stroke (NIHSS > 25).   []   History of BOTH diabetes and previous ischemic stroke.   []   The risks and benefits have been discussed with the patient or family related to the administration of IV Alteplase for stroke symptoms.   []   I have discussed and reviewed the patient's case and imaging with the attending prior to IV Alteplase.   N/A Time Alteplase administered     MODIFIED TEMI SCALE (to be assessed for each patient having history of stroke) []Stroke history but not assessed  [x]0: No symptoms at all  []1: No significant disability despite symptoms  []2: Slight disability  []3: Moderate disability  []4: Moderately severe disability  []5: Severe disability  []6: Death      NIH Stroke Scale  Time: 2145  Person Administering Scale: Rodolfo Voss PA-C    1a  Level of consciousness: 0=alert; keenly responsive   1b. LOC questions:  0=Performs both tasks correctly   1c. LOC commands: 0=Performs both tasks correctly   2.  Best Gaze: 0=normal   3.  Visual: 0=No visual loss   4. Facial Palsy: 0=Normal symmetric movement   5a.  Motor left arm: 0=No drift, limb holds 90 (or 45) degrees for full 10 seconds   5b.  Motor right arm: 0=No drift, limb holds 90 (or 45) degrees for full 10 seconds   6a. motor left le=No drift, limb  holds 90 (or 45) degrees for full 10 seconds   6b  Motor right le=No drift, limb holds 90 (or 45) degrees for full 10 seconds   7. Limb Ataxia: 0=Absent   8.  Sensory: 0=Normal; no sensory loss   9. Best Language:  0=No aphasia, normal   10. Dysarthria: 0=Normal   11. Extinction and Inattention: 0=No abnormality    Total:    0     Hospital Meds  Scheduled-    Infusions-     PRNs-   sodium chloride    Results Reviewed  I have personally reviewed current lab, radiology, and data and agree with results.    CT Angiogram Head w AI Analysis of LVO    Result Date: 2/3/2025  Impression: 1.No evidence of large vessel occlusion. 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 A moderate grade stenosis in a left M2 branch. There are high-grade stenoses in the distal PCA branches bilaterally. A focal high-grade stenosis in the pericallosal right LISA. 3.There is moderate focal stenosis at the right vertebral artery origin and there is moderate focal stenosis in the mid right V4 segment. 4.Mild nonstenosing plaque in the carotid arteries. 5.Cervical spondylosis. Electronically Signed: Rolando Flores MD  2/3/2025 12:42 AM EST  Workstation ID: [id number]    CT Angiogram Neck    Result Date: 2/3/2025  Impression: 1.No evidence of large vessel occlusion. 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 A moderate grade stenosis in a left M2 branch. There are high-grade stenoses in the distal PCA branches bilaterally. A focal high-grade stenosis in the pericallosal right LISA. 3.There is moderate focal stenosis at the right vertebral artery origin and there is moderate focal stenosis in the mid right V4 segment. 4.Mild nonstenosing plaque in the carotid arteries. 5.Cervical spondylosis. Electronically Signed: Rolando Flores MD  2/3/2025 12:42 AM EST  Workstation ID: [id number]    CT CEREBRAL PERFUSION WITH & WITHOUT CONTRAST    Result Date: 2/3/2025  Impression: No significant perfusion  abnormality in the brain. Electronically Signed: Rolando Flores MD  2/3/2025 12:30 AM EST  Workstation ID: [id number]    CT Head Without Contrast Stroke Protocol    Result Date: 2/3/2025  Impression: 1.No acute intracranial abnormality. 2.Mild chronic small vessel ischemic change. Electronically Signed: Rolando Flores MD  2/3/2025 12:08 AM EST  Workstation ID: [id number]    XR Chest 1 View    Result Date: 2/2/2025  Impression: No acute cardiopulmonary abnormality. 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  Last known well 1900 on 2/2.  On my exam, patient states symptoms have significantly improved but mildly persist.  NIH is 0, no sensory deficit to light touch despite paresthesias.  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  Patient was not a candidate for IV thrombolytic therapy or emergent neurointervention due to improving symptoms and NIH of 0.    Antiplatelet PTA: None  Anticoagulant PTA: None    Left face and LUE paresthesias  -Differentials include acute stroke/TIA  -MRI brain without contrast pending  -Initiate DAPT with aspirin 81 mg daily and Plavix 300 mg load followed by 75 mg daily  -Allow autoregulation of blood pressure, SBP goal < 200  -NPO pending bedside swallow eval  -TTE pending  -LDL pending, start atorvastatin 80 mg nightly  -A1c pending  -Serial neurochecks per policy, stat Cth for any acute neurological change  -PT/OT/SLP as appropriate    Disposition: Admit to the hospital medicine service.      Case discussed with the  patient, family at bedside, and Dr. Hartman.  Thank you for the consult. Stroke neurology will continue to follow.       Rodolfo Voss PA-C  [facility] Stroke Neurology

## 2025-02-04 ENCOUNTER — READMISSION MANAGEMENT (OUTPATIENT)
Dept: CALL CENTER | Facility: HOSPITAL | Age: OVER 89
End: 2025-02-04
Payer: MEDICARE

## 2025-02-04 ENCOUNTER — OFFICE VISIT (OUTPATIENT)
Dept: CARDIOLOGY | Facility: HOSPITAL | Age: OVER 89
End: 2025-02-04
Payer: MEDICARE

## 2025-02-04 ENCOUNTER — HOSPITAL ENCOUNTER (OUTPATIENT)
Dept: CARDIOLOGY | Facility: HOSPITAL | Age: OVER 89
Discharge: HOME | End: 2025-02-04
Payer: MEDICARE

## 2025-02-04 VITALS
WEIGHT: 193.8 LBS | HEIGHT: 73 IN | OXYGEN SATURATION: 98 % | RESPIRATION RATE: 20 BRPM | HEART RATE: 61 BPM | BODY MASS INDEX: 25.69 KG/M2 | SYSTOLIC BLOOD PRESSURE: 184 MMHG | DIASTOLIC BLOOD PRESSURE: 77 MMHG

## 2025-02-04 VITALS
TEMPERATURE: 97.8 F | BODY MASS INDEX: 25.98 KG/M2 | HEART RATE: 51 BPM | SYSTOLIC BLOOD PRESSURE: 130 MMHG | RESPIRATION RATE: 18 BRPM | DIASTOLIC BLOOD PRESSURE: 74 MMHG | OXYGEN SATURATION: 100 % | HEIGHT: 73 IN | WEIGHT: 195.99 LBS

## 2025-02-04 DIAGNOSIS — G45.9 TIA (TRANSIENT ISCHEMIC ATTACK): ICD-10-CM

## 2025-02-04 DIAGNOSIS — G45.9 TIA (TRANSIENT ISCHEMIC ATTACK): Primary | ICD-10-CM

## 2025-02-04 DIAGNOSIS — I10 ESSENTIAL HYPERTENSION: ICD-10-CM

## 2025-02-04 LAB
GLUCOSE BLDC GLUCOMTR-MCNC: 104 MG/DL (ref 70–130)
GLUCOSE BLDC GLUCOMTR-MCNC: 104 MG/DL (ref 70–130)

## 2025-02-04 PROCEDURE — 82948 REAGENT STRIP/BLOOD GLUCOSE: CPT

## 2025-02-04 PROCEDURE — 99213 OFFICE O/P EST LOW 20 MIN: CPT | Performed by: NURSE PRACTITIONER

## 2025-02-04 PROCEDURE — G0378 HOSPITAL OBSERVATION PER HR: HCPCS

## 2025-02-04 PROCEDURE — 97161 PT EVAL LOW COMPLEX 20 MIN: CPT

## 2025-02-04 RX ORDER — ASPIRIN 81 MG/1
81 TABLET, CHEWABLE ORAL DAILY
Qty: 30 TABLET | Refills: 0 | Status: SHIPPED | OUTPATIENT
Start: 2025-02-05

## 2025-02-04 RX ORDER — ATORVASTATIN CALCIUM 40 MG/1
40 TABLET, FILM COATED ORAL NIGHTLY
Status: DISCONTINUED | OUTPATIENT
Start: 2025-02-04 | End: 2025-02-04 | Stop reason: HOSPADM

## 2025-02-04 RX ORDER — CLOPIDOGREL BISULFATE 75 MG/1
75 TABLET ORAL DAILY
Qty: 21 TABLET | Refills: 0 | Status: SHIPPED | OUTPATIENT
Start: 2025-02-05

## 2025-02-04 RX ORDER — ATORVASTATIN CALCIUM 40 MG/1
40 TABLET, FILM COATED ORAL NIGHTLY
Qty: 90 TABLET | Refills: 0 | Status: SHIPPED | OUTPATIENT
Start: 2025-02-04

## 2025-02-04 RX ADMIN — ASPIRIN 81 MG CHEWABLE TABLET 81 MG: 81 TABLET CHEWABLE at 08:45

## 2025-02-04 RX ADMIN — Medication 10 ML: at 08:45

## 2025-02-04 RX ADMIN — LEVOTHYROXINE SODIUM 137 MCG: 0.14 TABLET ORAL at 06:28

## 2025-02-04 RX ADMIN — CLOPIDOGREL BISULFATE 75 MG: 75 TABLET ORAL at 08:45

## 2025-02-04 RX ADMIN — BRIMONIDINE TARTRATE 1 DROP: 2 SOLUTION/ DROPS OPHTHALMIC at 08:46

## 2025-02-04 RX ADMIN — DORZOLAMIDE HYDROCHLORIDE: 20 SOLUTION/ DROPS OPHTHALMIC at 08:46

## 2025-02-04 RX ADMIN — CYCLOSPORINE 1 DROP: 0.5 EMULSION OPHTHALMIC at 08:45

## 2025-02-04 RX ADMIN — FERROUS SULFATE TAB 325 MG (65 MG ELEMENTAL FE) 325 MG: 325 (65 FE) TAB at 08:45

## 2025-02-04 NOTE — DISCHARGE INSTRUCTIONS
Please continue to take aspirin 81 mg and Plavix 75 mg daily for a total of 21 days (through February 24, 2025) then stop Plavix and continue aspirin 81 mg only.  This is to prevent future TIA/strokes.    Please continue to take atorvastatin 40 mg daily to help lower your cholesterol.    Please be aware of any signs and symptoms of TIA/stroke that would warrant emergent evaluation at the emergency department/call to 911 including: unilateral weakness, unilateral numbness, visual disturbances, loss of balance, speech difficulties, and/or a sudden severe headache.

## 2025-02-04 NOTE — PLAN OF CARE
Goal Outcome Evaluation:  Plan of Care Reviewed With: patient, spouse, friend           Outcome Evaluation: Pt presents at baseline for functional mobility tasks. No deficits identified requiring PT services. Rec home with assist upon dc.    Anticipated Discharge Disposition (PT): home

## 2025-02-04 NOTE — PROGRESS NOTES
[facility] Heart Monitor Documentation    Juancho Smith Jr.  4/16/1933  [id number]  02/04/25      [] ZIO XT Patch  Model [id number] Prescribed for  Days    Serial Number: (N + 9 Digits) N   Apply-By Date on Box:   USPS Tracking Number:   USPS Tracking        [x] [employer] MINI PLUS Mobile Cardiac Telemetry  Model BGMINIPLUS Prescribed for 30 Days    Serial Number: (BGM + 7 Digits) [id number]  Shipped-By Date on Box: 01/23/2025  UPS Tracking Number: [id number]  UPS Tracking      [] [employer] MINI Holter Monitor  Model BGMINIEL Prescribed for  Days    Serial Number: (7 Digits)   Shipped-By Date on Box:   UPS Tracking Number: [id number]  UPS Tracking        This monitor was applied to the patient's chest and checked for proper functioning.  Mr. Juancho Smith Jr. was instructed in the proper use of this monitor.  He was given the opportunity to ask questions and left the office with the device 's instruction manual.    Brandy Govea [facility], 14:37 EST, 02/04/25                  [facility] 8[facility]

## 2025-02-04 NOTE — PROGRESS NOTES
Stroke Progress Note       Chief Complaint:  Left sided paresthesias    Subjective    Subjective     Subjective: Patient is sitting up in the recliner family is at bedside.  We reviewed his symptoms were most consistent with a TIA.  He has been having some tingling down his left neck and into his left thumb and forefinger this has been going on for quite some time but just prior to admission his whole arm was numb which is different from his normal.  We discussed that he likely has some level of cervical radiculopathy from his neck, he declines any interventions at this time does not want any PT or neurosurgical referral.  He initially declined a cardiac monitor but after discussing with his wife would like to have a 30-day monitor placed prior to discharge as planned.        Review of Systems   Constitutional:  Negative for fever.   HENT:  Negative for trouble swallowing.    Eyes:  Negative for visual disturbance.   Respiratory:  Negative for cough and shortness of breath.    Cardiovascular:  Negative for chest pain and palpitations.   Gastrointestinal:  Negative for nausea and vomiting.   Musculoskeletal: Negative.  Positive for gait problem and joint swelling.   Neurological:  Positive for numbness. Negative for facial asymmetry, speech difficulty, weakness and headaches.   Psychiatric/Behavioral: Negative.              Objective    Objective      Temp:  [97.8 °F (36.6 °C)-98 °F (36.7 °C)] 97.8 °F (36.6 °C)  Heart Rate:  [51-97] 77  Resp:  [16-18] 18  BP: (133-190)/(60-83) 153/73        Neurological Exam  Mental Status  Alert. Oriented to person, place, and time. Speech is normal. Language is fluent with no aphasia. Attention and concentration are normal.    Cranial Nerves  CN II: Visual fields full to confrontation.  CN III, IV, VI: Extraocular movements intact bilaterally. Normal lids and orbits bilaterally. Pupils equal round and reactive to light bilaterally.  CN V: Facial sensation is normal.  CN VII: Full  and symmetric facial movement.  CN XI: Shoulder shrug strength is normal.  CN XII: Tongue midline without atrophy or fasciculations.    Motor  Normal muscle bulk throughout. No fasciculations present. Normal muscle tone. No abnormal involuntary movements. Strength is 5/5 throughout all four extremities.    Sensory  Light touch is normal in upper and lower extremities.     Coordination  Right: Finger-to-nose normal.Left: Finger-to-nose normal.    Gait    Not tested.      Physical Exam  Vitals reviewed.   Constitutional:       Appearance: Normal appearance.   HENT:      Head: Normocephalic and atraumatic.   Eyes:      General: Lids are normal.      Extraocular Movements: Extraocular movements intact.      Pupils: Pupils are equal, round, and reactive to light.   Cardiovascular:      Rate and Rhythm: Normal rate and regular rhythm.   Pulmonary:      Effort: Pulmonary effort is normal. No respiratory distress.   Musculoskeletal:         General: No swelling. Normal range of motion.      Cervical back: Normal range of motion.   Skin:     General: Skin is warm and dry.   Neurological:      Mental Status: He is alert and oriented to person, place, and time. Mental status is at baseline.      Cranial Nerves: No cranial nerve deficit.      Sensory: No sensory deficit.      Motor: Motor strength is normal.No weakness.      Coordination: Coordination normal.   Psychiatric:         Mood and Affect: Mood normal.         Speech: Speech normal.         Behavior: Behavior normal.         Results Review:    I reviewed the patient's new clinical results.    WBC   Date Value Ref Range Status   02/03/2025 6.46 3.40 - 10.80 10*3/mm3 Final     RBC   Date Value Ref Range Status   02/03/2025 4.28 4.14 - 5.80 10*6/mm3 Final     Hemoglobin   Date Value Ref Range Status   02/03/2025 13.2 13.0 - 17.7 g/dL Final     Hematocrit   Date Value Ref Range Status   02/03/2025 40.3 37.5 - 51.0 % Final     MCV   Date Value Ref Range Status   02/03/2025  94.2 79.0 - 97.0 fL Final     MCH   Date Value Ref Range Status   02/03/2025 30.8 26.6 - 33.0 pg Final     MCHC   Date Value Ref Range Status   02/03/2025 32.8 31.5 - 35.7 g/dL Final     RDW   Date Value Ref Range Status   02/03/2025 12.3 12.3 - 15.4 % Final     RDW-SD   Date Value Ref Range Status   02/03/2025 42.3 37.0 - 54.0 fl Final     MPV   Date Value Ref Range Status   02/03/2025 8.9 6.0 - 12.0 fL Final     Platelets   Date Value Ref Range Status   02/03/2025 261 140 - 450 10*3/mm3 Final     Neutrophil %   Date Value Ref Range Status   02/02/2025 65.7 42.7 - 76.0 % Final     Lymphocyte %   Date Value Ref Range Status   02/02/2025 19.2 (L) 19.6 - 45.3 % Final     Monocyte %   Date Value Ref Range Status   02/02/2025 10.5 5.0 - 12.0 % Final     Eosinophil %   Date Value Ref Range Status   02/02/2025 3.3 0.3 - 6.2 % Final     Basophil %   Date Value Ref Range Status   02/02/2025 0.8 0.0 - 1.5 % Final     Immature Grans %   Date Value Ref Range Status   02/02/2025 0.5 0.0 - 0.5 % Final     Neutrophils, Absolute   Date Value Ref Range Status   02/02/2025 4.13 1.70 - 7.00 10*3/mm3 Final     Lymphocytes, Absolute   Date Value Ref Range Status   02/02/2025 1.21 0.70 - 3.10 10*3/mm3 Final     Monocytes, Absolute   Date Value Ref Range Status   02/02/2025 0.66 0.10 - 0.90 10*3/mm3 Final     Eosinophils, Absolute   Date Value Ref Range Status   02/02/2025 0.21 0.00 - 0.40 10*3/mm3 Final     Basophils, Absolute   Date Value Ref Range Status   02/02/2025 0.05 0.00 - 0.20 10*3/mm3 Final     Immature Grans, Absolute   Date Value Ref Range Status   02/02/2025 0.03 0.00 - 0.05 10*3/mm3 Final     nRBC   Date Value Ref Range Status   02/02/2025 0.0 0.0 - 0.2 /100 WBC Final     Lab Results   Component Value Date    GLUCOSE 112 (H) 02/03/2025    BUN 28 (H) 02/03/2025    CREATININE 0.88 02/03/2025     02/03/2025    K 3.7 02/03/2025     02/03/2025    CALCIUM 9.7 (H) 02/03/2025    PROTEINTOT 6.9 02/02/2025    ALBUMIN 4.2  02/02/2025    ALT 21 02/02/2025    AST 23 02/02/2025    ALKPHOS 78 02/02/2025    BILITOT 0.2 02/02/2025    GLOB 2.7 02/02/2025    AGRATIO 1.6 02/02/2025    BCR 31.8 (H) 02/03/2025    ANIONGAP 9.0 02/03/2025    EGFR 81.2 02/03/2025     Lipid Panel          2/3/2025    07:41   Lipid Panel   Total Cholesterol 152    Triglycerides 107    HDL Cholesterol 53    VLDL Cholesterol 19    LDL Cholesterol  80    LDL/HDL Ratio 1.46      A1c 5.4    MRI Cervical Spine With & Without Contrast     Result Date: 2/3/2025  Impression: 1.Moderate cervical spondylosis with varying degrees of neuroforaminal narrowing and mild narrowing of the spinal canal at multiple levels. 2.No acute osseous abnormality. 3.Heterogeneous nodular right thyroid lobe. This could be further evaluated with ultrasound. Electronically Signed: Rolando Flores MD  2/3/2025 6:26 AM EST  Workstation ID: [id number]     MRI Brain Without Contrast     Result Date: 2/3/2025  Impression: 1.No acute intracranial abnormality. 2.Mild chronic small vessel ischemic change. Electronically Signed: Rolando Flores MD  2/3/2025 5:51 AM EST  Workstation ID: [id number]     CT Angiogram Head w AI Analysis of LVO     Result Date: 2/3/2025  Impression: 1.No evidence of large vessel occlusion. 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 A moderate grade stenosis in a left M2 branch. There are high-grade stenoses in the distal PCA branches bilaterally. A focal high-grade stenosis in the pericallosal right LISA. 3.There is moderate focal stenosis at the right vertebral artery origin and there is moderate focal stenosis in the mid right V4 segment. 4.Mild nonstenosing plaque in the carotid arteries. 5.Cervical spondylosis. Electronically Signed: Rolando Flores MD  2/3/2025 12:42 AM EST  Workstation ID: [id number]     CT Angiogram Neck     Result Date: 2/3/2025  Impression: 1.No evidence of large vessel occlusion. 2.There are multiple high-grade stenoses in the right MCA  territory. A moderate grade stenosis in a left M2 branch. There are high-grade stenoses in the distal PCA branches bilaterally. A focal high-grade stenosis in the pericallosal right LISA. 3.There is moderate focal stenosis at the right vertebral artery origin and there is moderate focal stenosis in the mid right V4 segment. 4.Mild nonstenosing plaque in the carotid arteries. 5.Cervical spondylosis. Electronically Signed: Rolando Flores MD  2/3/2025 12:42 AM EST  Workstation ID: [id number]     CT CEREBRAL PERFUSION WITH & WITHOUT CONTRAST     Result Date: 2/3/2025  Impression: No significant perfusion abnormality in the brain. Electronically Signed: Rolando Flores MD  2/3/2025 12:30 AM EST  Workstation ID: [id number]     CT Head Without Contrast Stroke Protocol     Result Date: 2/3/2025  Impression: 1.No acute intracranial abnormality. 2.Mild chronic small vessel ischemic change. Electronically Signed: Rolando Flores MD  2/3/2025 12:08 AM EST  Workstation ID: [id number]       Results for orders placed during the hospital encounter of 02/02/25    Adult Transthoracic Echo Complete W/ Cont if Necessary Per Protocol (With Agitated Saline)    Interpretation Summary    Left ventricular ejection fraction appears to be 56 - 60%.    Left ventricular wall thickness is consistent with mild concentric hypertrophy.    Left ventricular diastolic function is consistent with age.    There is calcification of the aortic valve.    There is posterior mitral leaflet thickening present.    Estimated right ventricular systolic pressure from tricuspid regurgitation is normal (<35 mmHg). Calculated right ventricular systolic pressure from tricuspid regurgitation is 15 mmHg.        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   Last known well 1900 on 2/2.  NIH is 0, no sensory deficit to light touch despite paresthesias.  CTA imaging reveals diffuse iCAD includingmultifocal high-grade stenoses in the right MCA territory.  Patient was not a candidate for IV thrombolytic therapy or emergent neurointervention due to improving symptoms and NIH of 0.       Antiplatelet PTA: None  Anticoagulant PTA: None      TIA localizable to the right cerebral hemisphere  -Recommend continuing aspirin 81 and Plavix 75 mg for 21 days followed by aspirin 81 mg monotherapy  -Continue atorvastatin 40 mg daily, (LDL is 80, goal <70)  -TTE shows EF of 60%, no bubble study performed due to age  -30 day cardiac monitor to be placed prior to discharge  -Normal blood pressure parameters, <140/90  -Suspect his chronic tingling in the left first and second digit are likely due to cervical radiculopathy per MRI findings.  Patient declined any interventions at this time  -PT/OT eval's are pending  -Stroke follow-up in clinic in 4 weeks        Discussed the importance of medication compliance Plavix 75mg daily, Aspirin 81mg daily, and Atorvastatin 40mg nightly and lifestyle modifications adequate control of blood pressure, adequate control of cholesterol (goal LDL <70), increased physical activity, and implementation of healthy diet to help reduce the risk of future cerebrovascular events.  Also discussed the signs symptoms that would warrant the patient return back to the emergency department including unilateral weakness, unilateral numbness, visual disturbances, loss of balance, speech difficulties, and/or a sudden severe headache.  Patient verbalized understanding.      Stroke workup is complete, will follow-up in clinic.  Stroke neurology signing off, please call with questions or concerns thank you    Carlotta Garza, ALBIN  02/04/25  09:45 EST

## 2025-02-04 NOTE — DISCHARGE SUMMARY
[facility] Medicine Services  DISCHARGE SUMMARY    Patient Name: Juancho Smith Jr.  : 1933  MRN: [id number]    Date of Admission: 2025  9:22 PM  Date of Discharge: 2025  Primary Care Physician: Rolando Saavedra MD    Consults       Date and Time Order Name Status Description    2025  9:33 PM Inpatient Neurology Consult Stroke Completed             Hospital Course     Active Hospital Problems    Diagnosis  POA    **TIA (transient ischemic attack) [G45.9]  Yes      Resolved Hospital Problems   No resolved problems to display.      Hospital Course:  Juancho Smith Jr. is a 89 y.o. 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  Last known well  on .  On my exam, patient states symptoms have significantly improved but mildly persist.  NIH is 0, no sensory deficit to light touch despite paresthesias.  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  Patient was not a candidate for IV thrombolytic therapy or emergent neurointervention due to improving symptoms and NIH of 0.     Suspected TIA  -Patient presenting with left face and LUE paresthesias, symptoms concerning for acute stroke versus TIA versus peripheral.  More likely TIA, symptoms are resolving  -Stroke neurology followed, MRI brain with no acute intracranial abnormality, echo is unremarkable  -Continue DAPT with aspirin and Plavix for 21 days followed by aspirin 81 mg monotherapy, continue statin  -Patient will need Holter monitor at discharge, he elects to pursue this with PCP  -Continue BP control  -PT/OT evaluated and okay to discharge home     Hx of cervical spine stenosis?  -MRI C-spine shows moderate cervical spondylosis with varying degrees of neuroforaminal narrowing, no acute osseous abnormality  -Will  follow-up with PCP     Glaucoma  Hypothyroidism  -Continue with home medications     Hypertension  -Continue HCTZ    Discharge Follow Up Recommendations for outpatient labs/diagnostics:  Follow-up with PCP in 1 week  Follow-up with neurology as scheduled    Day of Discharge     HPI: No acute events overnight, patient feels much better, does some residual left finger numbness and tingling     Review of Systems  Gen- No fevers, chills  CV- No chest pain, palpitations  Resp- No cough, dyspnea  GI- No N/V/D, abd pain     Vital Signs:   Temp:  [97.8 °F (36.6 °C)-98 °F (36.7 °C)] 97.8 °F (36.6 °C)  Heart Rate:  [51-97] 51  Resp:  [16-18] 18  BP: (130-190)/(60-83) 130/74    Physical Exam:  Constitutional: Elderly male, in no acute distress, awake, alert  HENT: NCAT, mucous membranes moist  Respiratory: Clear to auscultation bilaterally, respiratory effort normal   Cardiovascular: RRR, no murmurs, rubs, or gallops  Gastrointestinal: Positive bowel sounds, soft, nontender, nondistended  Musculoskeletal: No bilateral ankle edema  Psychiatric: Appropriate affect, cooperative  Neurologic: Oriented x 3, nonfocal    Pertinent  and/or Most Recent Results     LAB RESULTS:      Lab 02/03/25  0752 02/02/25 2243   WBC 6.46 6.29   HEMOGLOBIN 13.2 13.4   HEMATOCRIT 40.3 39.9   PLATELETS 261 274   NEUTROS ABS  --  4.13   IMMATURE GRANS (ABS)  --  0.03   LYMPHS ABS  --  1.21   MONOS ABS  --  0.66   EOS ABS  --  0.21   MCV 94.2 93.0   PROTIME  --  12.7   APTT  --  33.2         Lab 02/03/25  0752 02/03/25  0741 02/02/25  2243   SODIUM  --  139 144   POTASSIUM  --  3.7 4.1   CHLORIDE  --  103 104   CO2  --  27.0 29.0   ANION GAP  --  9.0 11.0   BUN  --  28* 31*   CREATININE  --  0.88 1.00   EGFR  --  81.2 71.1   GLUCOSE  --  112* 117*   CALCIUM  --  9.7* 10.2*   HEMOGLOBIN A1C 5.40  --   --          Lab 02/02/25  2243   TOTAL PROTEIN 6.9   ALBUMIN 4.2   GLOBULIN 2.7   ALT (SGPT) 21   AST (SGOT) 23   BILIRUBIN 0.2   ALK PHOS 78         Lab  02/02/25  2243   PROTIME 12.7   INR 0.95         Lab 02/03/25  0741   CHOLESTEROL 152   LDL CHOL 80   HDL CHOL 53   TRIGLYCERIDES 107             Brief Urine Lab Results       None          Microbiology Results (last 10 days)       ** No results found for the last 240 hours. **            MRI Cervical Spine With & Without Contrast    Result Date: 2/3/2025  MRI CERVICAL SPINE W WO CONTRAST Date of Exam: 2/3/2025 5:24 AM EST Indication: L face and L arm numbness.  Comparison: None available. Technique:  Routine multiplanar/multisequence sequence images of the cervical spine were obtained before and after the uneventful administration of 15 mL Multihance.  Findings: Seven cervical vertebrae are identified. Alignment is anatomic. There is mild narrowing of the C2-C3, C3-C4 and C4-C5 discs and moderate to severe narrowing of the C5-C6, C6-C7 and C7-T1 discs. There is diffuse disc desiccation. Vertebral bodies maintain  normal height.  There is no focal bone marrow edema. There is degenerative bone marrow heterogeneity. There is no evidence of a marrow replacing process. No acute osseous abnormalities. Spinal cord signal is normal. Paraspinal musculature is grossly preserved. There is no evidence of cervical lymphadenopathy or a neck mass. The craniocervical junction appears within normal limits. Limited view of the posterior fossa contents is unremarkable. The left thyroid lobe is atrophic. The right thyroid lobe is nodular and heterogeneous with dominant nodule measuring up to 26 x 20 mm. This could be further evaluated with ultrasound. C2-C3: There is mild posterior disc osteophyte complex. There is mild right uncovertebral hypertrophy. There is moderate right and mild left facet hypertrophy. There is moderate right and mild left neuroforaminal narrowing. C3-C4: There is posterior disc osteophyte complex. There is moderate right and mild left facet hypertrophy and moderate bilateral uncovertebral hypertrophy. There is  mild bilateral neuroforaminal narrowing. C4-C5: There is posterior disc osteophyte complex resulting in mild narrowing of the spinal canal. There is moderate to severe right and moderate left uncovertebral hypertrophy and there is moderate right and mild left facet hypertrophy. There is severe right and moderate left neuroforaminal narrowing. C5-C6: There is posterior disc osteophyte complex. There is moderate bilateral uncovertebral hypertrophy. Facet joints are unremarkable. There is severe left and moderate right neuroforaminal narrowing. There is mild narrowing of the spinal canal. C6-C7: There is posterior disc osteophyte complex. There is moderate uncovertebral hypertrophy bilaterally. The facet joints appear normal. There is moderate bilateral neuroforaminal narrowing. C7-T1: There is posterior disc osteophyte complex. There is mild bilateral facet of perjury. There is mild bilateral neuroforaminal narrowing.     Impression: 1.Moderate cervical spondylosis with varying degrees of neuroforaminal narrowing and mild narrowing of the spinal canal at multiple levels. 2.No acute osseous abnormality. 3.Heterogeneous nodular right thyroid lobe. This could be further evaluated with ultrasound. Electronically Signed: Rolando Flores MD  2/3/2025 6:26 AM EST  Workstation ID: [id number]    MRI Brain Without Contrast    Result Date: 2/3/2025  MRI BRAIN WO CONTRAST Date of Exam: 2/3/2025 5:19 AM EST Indication: Stroke, follow up. Left-sided numbness, acute stroke suspected.  Comparison: CT head with perfusion and angiography 2/2/2025. Technique:  Routine multiplanar/multisequence sequence images of the brain were obtained without contrast administration. Findings: There is no diffusion restriction to suggest acute infarct. There is no evidence of acute or chronic intracranial hemorrhage. There is mild patchy periventricular white matter FLAIR hyperintense signal abnormality. There are similar findings in the central julissa. No  mass effect or midline shift. No abnormal extra-axial collections.  The major vascular flow voids appear intact. The basal ganglia, brainstem and cerebellum appear within normal limits.  Calvarial and superficial soft tissue signal is within normal limits. There is evidence of orbital lens replacement. There is mild left maxillary sinus mucosal disease. Midline structures are intact.     Impression: 1.No acute intracranial abnormality. 2.Mild chronic small vessel ischemic change. Electronically Signed: Rolando Flores MD  2/3/2025 5:51 AM EST  Workstation ID: [id number]    CT Angiogram Head w AI Analysis of LVO    Result Date: 2/3/2025  CT ANGIOGRAM HEAD W AI ANALYSIS OF LVO CT ANGIOGRAM NECK Date of Exam: 2/2/2025 11:39 PM EST Indication: Neuro deficit, acute, stroke suspected Comparison: None available. Technique: CTA of the head and neck was performed after the uneventful intravenous administration of 120 mL of Isovue-370. Reconstructed coronal and sagittal images were also obtained. In addition, a 3-D volume rendered image was created for interpretation. Automated exposure control and iterative reconstruction methods were used. Findings: Aorta: There is mild aortic plaque. There is conventional three-vessel arch anatomy. The right brachiocephalic and bilateral subclavian arteries appear widely patent. Right carotid: There is mild nonstenosing CCA plaque. There is mild nonstenosing mixed plaque at the carotid bifurcation. ECA and distal branches appear normal. Cervical ICA is tortuous, but widely patent. There is mild nonstenosing segmental calcific intracranial ICA plaque. There is a patent P-comm. There is a patent A-Comm. Right A1 segment is hypoplastic. There is a focal short segment high-grade stenosis in the pericallosal LISA with patent distal branches. There is a normal right M1 segment. There are 2 focal areas of high-grade M2 stenoses. There is a focal high-grade M3 stenosis in the posterior insular region.  There is a high-grade stenosis in a more distal M3 segment. Left carotid: There is mild nonstenosing CCA and carotid bifurcation plaque. ECA and distal branches are patent. Cervical ICA is tortuous, but widely patent. There is mild nonstenosing intracranial ICA plaque. No definite P-comm. Normal A1 and M1 segments. Distal LISA branches are patent. There is a moderate grade focal stenosis in a left M2 branch. Distal MCA branches are patent. Posterior circulation: There is moderate focal stenosis at the right vertebral artery origin. The vertebral arteries otherwise appear uniform caliber throughout the neck. There is moderate focal narrowing of the mid right V4 segment. There is mild left V4 segment disease. Basilar artery and superior cerebellar arteries appear patent. P1 segments are normal. There are high-grade stenoses in the distal PCA branches bilaterally. Nonvascular findings: No acute intracranial abnormality. Thinning of the orbital lenses would suggest prior cataract surgery. There is mild left maxillary sinus mucosal disease. Mastoid air cells appear well aerated. Salivary glands are symmetric. There is no evidence of a neck mass or adenopathy. Left thyroid lobe is atrophic. Right thyroid lobe is heterogeneous and asymmetrically prominent. The lung apices are clear. There is no focal soft tissue inflammation or fluid collection. No acute osseous abnormality or destructive bone lesion. There is moderate cervical spondylosis..     Impression: 1.No evidence of large vessel occlusion. 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 A moderate grade stenosis in a left M2 branch. There are high-grade stenoses in the distal PCA branches bilaterally. A focal high-grade stenosis in the pericallosal right LISA. 3.There is moderate focal stenosis at the right vertebral artery origin and there is moderate focal stenosis in the mid right V4 segment. 4.Mild nonstenosing plaque in the carotid arteries.  5.Cervical spondylosis. Electronically Signed: Rolando Flores MD  2/3/2025 12:42 AM EST  Workstation ID: [id number]    CT Angiogram Neck    Result Date: 2/3/2025  CT ANGIOGRAM HEAD W AI ANALYSIS OF LVO CT ANGIOGRAM NECK Date of Exam: 2/2/2025 11:39 PM EST Indication: Neuro deficit, acute, stroke suspected Comparison: None available. Technique: CTA of the head and neck was performed after the uneventful intravenous administration of 120 mL of Isovue-370. Reconstructed coronal and sagittal images were also obtained. In addition, a 3-D volume rendered image was created for interpretation. Automated exposure control and iterative reconstruction methods were used. Findings: Aorta: There is mild aortic plaque. There is conventional three-vessel arch anatomy. The right brachiocephalic and bilateral subclavian arteries appear widely patent. Right carotid: There is mild nonstenosing CCA plaque. There is mild nonstenosing mixed plaque at the carotid bifurcation. ECA and distal branches appear normal. Cervical ICA is tortuous, but widely patent. There is mild nonstenosing segmental calcific intracranial ICA plaque. There is a patent P-comm. There is a patent A-Comm. Right A1 segment is hypoplastic. There is a focal short segment high-grade stenosis in the pericallosal LISA with patent distal branches. There is a normal right M1 segment. There are 2 focal areas of high-grade M2 stenoses. There is a focal high-grade M3 stenosis in the posterior insular region. There is a high-grade stenosis in a more distal M3 segment. Left carotid: There is mild nonstenosing CCA and carotid bifurcation plaque. ECA and distal branches are patent. Cervical ICA is tortuous, but widely patent. There is mild nonstenosing intracranial ICA plaque. No definite P-comm. Normal A1 and M1 segments. Distal LISA branches are patent. There is a moderate grade focal stenosis in a left M2 branch. Distal MCA branches are patent. Posterior circulation: There is  moderate focal stenosis at the right vertebral artery origin. The vertebral arteries otherwise appear uniform caliber throughout the neck. There is moderate focal narrowing of the mid right V4 segment. There is mild left V4 segment disease. Basilar artery and superior cerebellar arteries appear patent. P1 segments are normal. There are high-grade stenoses in the distal PCA branches bilaterally. Nonvascular findings: No acute intracranial abnormality. Thinning of the orbital lenses would suggest prior cataract surgery. There is mild left maxillary sinus mucosal disease. Mastoid air cells appear well aerated. Salivary glands are symmetric. There is no evidence of a neck mass or adenopathy. Left thyroid lobe is atrophic. Right thyroid lobe is heterogeneous and asymmetrically prominent. The lung apices are clear. There is no focal soft tissue inflammation or fluid collection. No acute osseous abnormality or destructive bone lesion. There is moderate cervical spondylosis..     Impression: 1.No evidence of large vessel occlusion. 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 A moderate grade stenosis in a left M2 branch. There are high-grade stenoses in the distal PCA branches bilaterally. A focal high-grade stenosis in the pericallosal right LISA. 3.There is moderate focal stenosis at the right vertebral artery origin and there is moderate focal stenosis in the mid right V4 segment. 4.Mild nonstenosing plaque in the carotid arteries. 5.Cervical spondylosis. Electronically Signed: Rolando Flores MD  2/3/2025 12:42 AM EST  Workstation ID: [id number]    CT CEREBRAL PERFUSION WITH & WITHOUT CONTRAST    Result Date: 2/3/2025  CT CEREBRAL PERFUSION W WO CONTRAST Date of Exam: 2/2/2025 11:39 PM EST Indication: Neuro deficit, acute, stroke suspected  Comparison: None available. Technique: Axial CT images of the brain were obtained prior to and after the administration of 120 mL Isovue-370. Core blood volume,  core blood flow, mean transit time, and Tmax images were obtained utilizing the Rapid software protocol. A limited CT angiogram of the head was also performed to measure the blood vessel density. The radiation dose reduction device was turned on for each scan per the ALARA (As Low as Reasonably Achievable) protocol. Findings: Cerebral blood flow, blood volume and mean transit time maps are symmetric without large perfusion defect. CBF<30% volume: 0 mL Tmax>6sec volume: 0 mL Mismatch volume: 0 mL Mismatch ratio: None.     Impression: No significant perfusion abnormality in the brain. Electronically Signed: Rolando Flores MD  2/3/2025 12:30 AM EST  Workstation ID: [id number]    CT Head Without Contrast Stroke Protocol    Result Date: 2/3/2025  CT HEAD WO CONTRAST STROKE PROTOCOL Date of Exam: 2/2/2025 11:39 PM EST Indication: Neuro deficit, acute, stroke suspected Comparison: None available. Technique: Axial CT images were obtained of the head without contrast administration.  Reconstructed coronal images were also obtained. Automated exposure control and iterative construction methods were used. Scan Time: 2349 hours Results discussed with stroke team at 0005 hours. Findings: Superficial soft tissues appear within normal limits. The calvarium is intact.  Paranasal sinuses and mastoid air cells appear well aerated. There is thinning of the orbital lenses bilaterally suggesting prior lens replacement. There is no acute intracranial hemorrhage.  No mass effect or midline shift.  No abnormal extra-axial collections.  Gray-white differentiation is within normal limits. There is mild patchy white matter hypoattenuation. The ventricles appear normal in size and configuration for the patient's age.     Impression: 1.No acute intracranial abnormality. 2.Mild chronic small vessel ischemic change. Electronically Signed: Rolando Flores MD  2/3/2025 12:08 AM EST  Workstation ID: [id number]    XR Chest 1 View    Result Date:  2/2/2025  XR CHEST 1 VW Date of Exam: 2/2/2025 10:04 PM EST Indication: Acute Stroke Protocol (onset < 12 hrs) Comparison: None available. Findings: There is no pneumothorax, pleural effusion or focal airspace consolidation. Heart size and pulmonary vasculature appear within normal limits. Regional bones appear intact.     Impression: No acute cardiopulmonary abnormality. Electronically Signed: Rolando Flores MD  2/2/2025 10:47 PM EST  Workstation ID: [id number]             Results for orders placed during the hospital encounter of 02/02/25    Adult Transthoracic Echo Complete W/ Cont if Necessary Per Protocol (With Agitated Saline)    Interpretation Summary    Left ventricular ejection fraction appears to be 56 - 60%.    Left ventricular wall thickness is consistent with mild concentric hypertrophy.    Left ventricular diastolic function is consistent with age.    There is calcification of the aortic valve.    There is posterior mitral leaflet thickening present.    Estimated right ventricular systolic pressure from tricuspid regurgitation is normal (<35 mmHg). Calculated right ventricular systolic pressure from tricuspid regurgitation is 15 mmHg.      Plan for Follow-up of Pending Labs/Results:     Discharge Details        Discharge Medications        New Medications        Instructions Start Date   Aspirin Low Dose 81 MG chewable tablet  Generic drug: aspirin   81 mg, Oral, Daily   Start Date: February 5, 2025     atorvastatin 40 MG tablet  Commonly known as: LIPITOR   40 mg, Oral, Nightly      clopidogrel 75 MG tablet  Commonly known as: PLAVIX   75 mg, Oral, Daily   Start Date: February 5, 2025            Continue These Medications        Instructions Start Date   acetaminophen 500 MG tablet  Commonly known as: TYLENOL   500 mg, Every 6 Hours PRN      brimonidine 0.2 % ophthalmic solution  Commonly known as: ALPHAGAN   Administer 1 drop to both eyes 2 (Two) Times a Day.      dorzolamide-timolol 2-0.5 % ophthalmic  solution  Commonly known as: COSOPT   Administer 1 drop to both eyes 2 (Two) Times a Day.      dutasteride 0.5 MG capsule  Commonly known as: AVODART   1 capsule, Daily      ferrous sulfate 324 (65 Fe) MG tablet delayed-release EC tablet   324 mg, Daily With Breakfast      hydroCHLOROthiazide 25 MG tablet   1 tablet, Daily      levothyroxine 137 MCG tablet  Commonly known as: SYNTHROID, LEVOTHROID   137 mcg, Nightly      Lumigan 0.01 % ophthalmic drops  Generic drug: bimatoprost   1 drop, Nightly      multivitamin tablet tablet   Take 1 tablet by mouth Daily. OTC      omeprazole 20 MG capsule  Commonly known as: priLOSEC   20 mg, Daily      polyethyl glycol-propyl glycol 0.4-0.3 % solution ophthalmic solution (artificial tears)  Commonly known as: SYSTANE   1 drop, Daily PRN      Restasis 0.05 % ophthalmic emulsion  Generic drug: cycloSPORINE   Administer 1 drop to both eyes 2 (Two) Times a Day.      tamsulosin 0.4 MG capsule 24 hr capsule  Commonly known as: FLOMAX   1 capsule, Daily      vitamin D3 125 MCG (5000 UT) capsule capsule   5,000 Units, Daily               No Known Allergies      Discharge Disposition: Home  Home or Self Care    Diet:  Hospital:  Diet Order   Procedures    Diet: Cardiac; Healthy Heart (2-3 Na+); Fluid Consistency: Thin (IDDSI 0)     Activity: As tolerated      Restrictions or Other Recommendations:       CODE STATUS:    Code Status and Medical Interventions: CPR (Attempt to Resuscitate); Full Support   Ordered at: 02/03/25 0303     Level Of Support Discussed With:    Patient     Code Status (Patient has no pulse and is not breathing):    CPR (Attempt to Resuscitate)     Medical Interventions (Patient has pulse or is breathing):    Full Support       Future Appointments   Date Time Provider Department Center   3/12/2025  1:30 PM Mindy Llanos APRN [facility]       Additional Instructions for the Follow-ups that You Need to Schedule       Ambulatory Referral to [facility]  [facility] [place] [facility] [place]   As directed      Needs 30 days    Order Comments: Needs 30 days    Service Requested: Afib/Arrhythmia Clinic        Ambulatory Referral to Neurology   As directed      1 month            Katie Bustillo MD  02/04/25      Time Spent on Discharge:  I spent  30  minutes on this discharge activity which included: face-to-face encounter with the patient, reviewing the data in the system, coordination of the care with the nursing staff as well as consultants, documentation, and entering orders.

## 2025-02-04 NOTE — CASE MANAGEMENT/SOCIAL WORK
Case Management Discharge Note      Final Note: Plan is home with spouse. Family will transport. PT recommends hoem with assist. No discharge needs identified.         Selected Continued Care - Admitted Since 2/2/2025       Destination    No services have been selected for the patient.                Durable Medical Equipment    No services have been selected for the patient.                Dialysis/Infusion    No services have been selected for the patient.                Home Medical Care    No services have been selected for the patient.                Therapy    No services have been selected for the patient.                Community Resources    No services have been selected for the patient.                Community & DME    No services have been selected for the patient.                         Final Discharge Disposition Code: 01 - home or self-care

## 2025-02-04 NOTE — PROGRESS NOTES
"Chief Complaint  Suspected TIA    Subjective      History of Present Illness {  Problem List  Visit  Diagnosis   Encounters  Notes  Medications  Labs  Result Review Imaging  Media :23}     Juancho Smith Jr., 89 y.o. 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    Patient recently hospitalized at [facility] with concern for left face and left hand paresthesias. 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 He was followed by neurology and suspected of TIA.  TTE during hospitalization with preserved LVEF, bubble study deferred due to age.  Cardiac monitor recommended hospital discharge    Patient presents today immediately after hospital discharge eager to return home. Reports blood pressure generally controlled on home monitroing prior to recent hospitalization; SBP generally 135-150.   No prior arrhythmia symptoms prior to recent hospitalization.       Objective     Vital Signs:   Vitals:    02/04/25 1352 02/04/25 1358 02/04/25 1359   BP: (!) 203/82 175/72 (!) 184/77   BP Location: Left arm Left arm Left arm   Patient Position: Sitting Sitting Standing   Cuff Size: Adult Adult Adult   Pulse: 51 59 61   Resp:  20    SpO2: 98% 99% 98%   Weight:  87.9 kg (193 lb 12.8 oz)    Height:  185.4 cm (73\")      Body mass index is 25.57 kg/m².  Physical Exam  Vitals and nursing note reviewed.   Constitutional:       Appearance: Normal appearance.   HENT:      Head: Normocephalic.   Eyes:      Extraocular Movements: Extraocular movements intact.   Neck:      Vascular: No carotid bruit.   Cardiovascular:      Rate and Rhythm: Normal rate and regular rhythm.      Pulses: Normal pulses.      Heart sounds: Normal heart sounds, S1 normal and S2 normal. No murmur heard.  Pulmonary:      Effort: Pulmonary effort is normal. No respiratory distress.      " Breath sounds: Normal breath sounds.   Musculoskeletal:      Cervical back: Neck supple.      Right lower leg: No edema.      Left lower leg: No edema.   Skin:     General: Skin is warm and dry.   Neurological:      General: No focal deficit present.      Mental Status: He is alert.   Psychiatric:         Mood and Affect: Mood normal.         Behavior: Behavior normal.         Thought Content: Thought content normal.        Data Reviewed:{ Labs  Result Review  Imaging  Med Tab  Media :23}     ECG 12 Lead Stroke Evaluation (02/02/2025 22:34)  Adult Transthoracic Echo Complete W/ Cont if Necessary Per Protocol (With Agitated Saline) (02/03/2025 10:01)  CBC (No Diff) (02/03/2025 07:52)  Hemoglobin A1c (02/03/2025 07:52)  Basic Metabolic Panel (02/03/2025 07:41)  Lipid Panel (02/03/2025 07:41)      Assessment & Plan   Assessment and Plan {CC Problem List  Visit Diagnosis  ROS  Review (Popup)  Health Maintenance  Quality  BestPractice  Medications  SmartSets  SnapShot Encounters  Media :23}     1. TIA (transient ischemic attack)  -Recently hospitalized at [facility] with concern for left face and left hand paresthesias.   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   -He was followed by neurology and suspected of TIA.    -TTE during hospitalization with preserved LVEF, bubble study deferred due to age.  Cardiac monitor recommended hospital discharge  -Continue DAPT, atorvastatin as prescribed  -30d MCOT for arrhythmia surveillance    2. Essential hypertension  -Reports blood pressures generally controlled prior to recent hospitalization  -Elevated today immediately after hospital discharge  -Continue HCTZ 25 Mg daily  -Discussed home blood pressure monitoring prior to follow-up      Follow Up {Instructions Charge Capture  Follow-up Communications :23}     Return for Patient will call for f/u appt in 6 weeks.      Patient was given instructions and counseling  regarding his condition or for health maintenance advice. Please see specific information pulled into the AVS if appropriate. Patient was instructed to call the [facility] with any questions, concerns, or worsening symptoms.    Dictated Utilizing Dragon Dictation   Please note that portions of this note were completed with a voice recognition program. Part of this note may be an electronic transcription/translation of spoken language to printed text using the Dragon Dictation System.

## 2025-02-04 NOTE — CASE MANAGEMENT/SOCIAL WORK
Continued Stay Note   [place]     Patient Name: Juancho Smith Jr.  MRN: [id number]  Today's Date: 2/4/2025    Admit Date: 2/2/2025    Plan: Home with spouse   Discharge Plan       Row Name 02/04/25 1004       Plan    Plan Home with spouse    Plan Comments Spoke with patient at bedside. Plan is home with spouse. Family will transport. CM will continue to follow.    Final Discharge Disposition Code 01 - home or self-care                   Discharge Codes    No documentation.                       Heebr Yee RN

## 2025-02-04 NOTE — THERAPY DISCHARGE NOTE
Patient Name: Juancho Smith Jr.  : 1933    MRN: [id number]                              Today's Date: 2025       Admit Date: 2025    Visit Dx:     ICD-10-CM ICD-9-CM   1. Left arm numbness  R20.0 782.0   2. Numbness and tingling of left side of face  R20.0 782.0    R20.2    3. TIA (transient ischemic attack)  G45.9 435.9     Patient Active Problem List   Diagnosis    Prostate cancer    TIA (transient ischemic attack)     Past Medical History:   Diagnosis Date    Diabetes mellitus     Disease of thyroid gland     Glaucoma     Prostate cancer      Past Surgical History:   Procedure Laterality Date    CATARACT EXTRACTION      CHOLECYSTECTOMY      COLONOSCOPY      last one at 82    CYBERKNIFE  2022    prostate/SV    PARATHYROID GLAND SURGERY      PROSTATE BIOPSY      PROSTATE FIDUCIAL MARKER PLACEMENT  2022    THYROID SURGERY        General Information       Row Name 25 1158          Physical Therapy Time and Intention    Document Type discharge evaluation/summary  -KR     Mode of Treatment individual therapy;physical therapy  -KR       Row Name 25 1158          General Information    Patient Profile Reviewed yes  -KR     Prior Level of Function independent:;all household mobility;ADL's  pt works out 3x/week with  and walks dogs religiously, walking stick in community  -KR     Existing Precautions/Restrictions other (see comments)  LLQ visual deficit, L thumb and index finger numbness  -KR     Barriers to Rehab none identified  -KR       Row Name 25 1158          Living Environment    People in Home spouse  -KR       Row Name 25 1158          Home Main Entrance    Number of Stairs, Main Entrance two  -KR     Stair Railings, Main Entrance railings on both sides of stairs  -KR       Row Name 25 1158          Stairs Within Home, Primary    Stairs, Within Home, Primary chair lift  -KR       Row Name 25 1158          Cognition    Orientation Status  (Cognition) oriented x 4  -KR               User Key  (r) = Recorded By, (t) = Taken By, (c) = Cosigned By      Initials Name Provider Type    Mara Beebe PT Physical Therapist                   Mobility       Row Name 02/04/25 1159          Sit-Stand Transfer    Sit-Stand [place] (Transfers) standby assist  -KR       Row Name 02/04/25 1159          Gait/Stairs (Locomotion)    [place] Level (Gait) standby assist  -KR     Distance in Feet (Gait) 120  60 + 120  -KR     Deviations/Abnormal Patterns (Gait) gait speed decreased  -KR     [place] Level (Stairs) contact guard  -KR     Assistive Device (Stairs) other (see comments)  walking stick  -KR     Handrail Location (Stairs) left side (ascending)  -KR     Number of Steps (Stairs) 10  -KR     Ascending Technique (Stairs) step-to-step  -KR     Descending Technique (Stairs) step-to-step  -KR     Comment, (Gait/Stairs) pt ambulates at slow pace, however no safety concerns for ambulation or stair navigation.  -KR               User Key  (r) = Recorded By, (t) = Taken By, (c) = Cosigned By      Initials Name Provider Type    Mara Beebe PT Physical Therapist                   Obj/Interventions       Row Name 02/04/25 1200          Range of Motion Comprehensive    General Range of Motion no range of motion deficits identified  -KR       Row Name 02/04/25 1200          Strength Comprehensive (MMT)    General Manual Muscle Testing (MMT) Assessment no strength deficits identified  -KR       Row Name 02/04/25 1200          Balance    Balance Assessment sitting dynamic balance;sitting static balance;standing static balance;standing dynamic balance  -KR     Static Sitting Balance independent  -KR     Dynamic Sitting Balance independent  -KR     Position, Sitting Balance unsupported;sitting in chair  -KR     Static Standing Balance independent  -KR     Dynamic Standing Balance standby assist  -KR     Position/Device Used, Standing Balance  supported;other (see comments)  walking stick  -KR     Balance Interventions sitting;standing;sit to stand;supported;static;dynamic  -KR       Row Name 02/04/25 1200          Sensory Assessment (Somatosensory)    Sensory Assessment (Somatosensory) LE sensation intact  -KR               User Key  (r) = Recorded By, (t) = Taken By, (c) = Cosigned By      Initials Name Provider Type    Mara Beebe PT Physical Therapist                   Goals/Plan    No documentation.                  Clinical Impression       Row Name 02/04/25 1201          Pain    Pretreatment Pain Rating 0/10 - no pain  -KR     Posttreatment Pain Rating 0/10 - no pain  -KR       [facility] Name 02/04/25 1201          Plan of Care Review    Plan of Care Reviewed With patient;spouse;friend  -KR     Outcome Evaluation Pt presents at baseline for functional mobility tasks. No deficits identified requiring PT services. Rec home with assist upon dc.  -KR       [facility] Name 02/04/25 1201          Therapy Assessment/Plan (PT)    Criteria for Skilled Interventions Met (PT) no;no problems identified which require skilled intervention  -KR     Therapy Frequency (PT) evaluation only  -KR       [facility] Name 02/04/25 1201          Vital Signs    Pre Systolic BP Rehab 130  -KR     Pre Treatment Diastolic BP 74  -KR     Post Systolic BP Rehab 176  -KR     Post Treatment Diastolic BP 77  -KR     Pre Patient Position Sitting  -KR     Intra Patient Position Standing  -KR     Post Patient Position Sitting  -KR       [facility] Name 02/04/25 1201          Positioning and Restraints    Pre-Treatment Position sitting in chair/recliner  -KR     Post Treatment Position chair  -KR     In Chair notified nsg;sitting;encouraged to call for assist;with family/caregiver;call light within reach;waffle cushion  -KR               User Key  (r) = Recorded By, (t) = Taken By, (c) = Cosigned By      Initials Name Provider Type    Mara Beebe, CHRISTIANO Physical Therapist                   Outcome  Measures       Row Name 02/04/25 1202 02/04/25 0845       How much help from another person do you currently need...    Turning from your back to your side while in flat bed without using bedrails? 4  -KR 4  -VL    Moving from lying on back to sitting on the side of a flat bed without bedrails? 4  -KR 4  -VL    Moving to and from a bed to a chair (including a wheelchair)? 4  -KR 3  -VL    Standing up from a chair using your arms (e.g., wheelchair, bedside chair)? 4  -KR 4  -VL    Climbing 3-5 steps with a railing? 3  -KR 3  -VL    To walk in hospital room? 4  -KR 3  -VL    AM-PAC 6 Clicks Score (PT) 23  -KR 21  -VL    Highest Level of Mobility Goal 7 --> Walk 25 feet or more  -KR 6 --> Walk 10 steps or more  -VL      Row Name 02/04/25 1202          Modified Milly Scale    Pre-Stroke Modified Martin Scale 6 - Unable to determine (UTD) from the medical record documentation  -KR     Modified Martin Scale 1 - No significant disability despite symptoms.  Able to carry out all usual duties and activities.  -KR       Row Name 02/04/25 1202          Functional Assessment    Outcome Measure Options Modified Milly;AM-PAC 6 Clicks Basic Mobility (PT)  -KR               User Key  (r) = Recorded By, (t) = Taken By, (c) = Cosigned By      Initials Name Provider Type    VL Kirti Bobo RN Registered Nurse    Mara Beebe, PT Physical Therapist                  Physical Therapy Education       Title: PT OT SLP Therapies (In Progress)       Topic: Physical Therapy (In Progress)       Point: Mobility training (Done)       Learning Progress Summary            Patient Acceptance, E, VU by ROLAND at 2/4/2025 1202   Family Acceptance, E, VU by KR at 2/4/2025 1202                      Point: Home exercise program (Not Started)       Learner Progress:  Not documented in this visit.              Point: Body mechanics (Done)       Learning Progress Summary            Patient Acceptance, E, VU by ROLAND at 2/4/2025 1202   Family Acceptance,  E, VU by KR at 2/4/2025 1202                      Point: Precautions (Done)       Learning Progress Summary            Patient Acceptance, E, VU by KR at 2/4/2025 1202   Family Acceptance, E, VU by KR at 2/4/2025 1202                                      User Key       Initials Effective Dates Name Provider Type Discipline     12/30/22 -  Mara Daily PT Physical Therapist PT                  PT Recommendation and Plan     Outcome Evaluation: Pt presents at baseline for functional mobility tasks. No deficits identified requiring PT services. Rec home with assist upon dc.     Time Calculation:   PT Evaluation Complexity  History, PT Evaluation Complexity: 3 or more personal factors and/or comorbidities  Examination of Body Systems (PT Eval Complexity): total of 4 or more elements  Clinical Presentation (PT Evaluation Complexity): stable  Clinical Decision Making (PT Evaluation Complexity): low complexity  Overall Complexity (PT Evaluation Complexity): low complexity     PT Charges       Row Name 02/04/25 1203             Time Calculation    Start Time 1135  -KR      PT Received On 02/04/25  -KR         Untimed Charges    PT Eval/Re-eval Minutes 35  -KR         Total Minutes    Untimed Charges Total Minutes 35  -KR       Total Minutes 35  -KR                User Key  (r) = Recorded By, (t) = Taken By, (c) = Cosigned By      Initials Name Provider Type    Mara Beebe PT Physical Therapist                  Therapy Charges for Today       Code Description Service Date Service Provider Modifiers Qty    41623066034 HC PT EVAL LOW COMPLEXITY 3 2/4/2025 Mara Daily PT GP 1            PT G-Codes  Outcome Measure Options: Modified [place], AM-PAC 6 Clicks Basic Mobility (PT)  AM-PAC 6 Clicks Score (PT): 23  Modified Milly Scale: 1 - No significant disability despite symptoms.  Able to carry out all usual duties and activities.    PT Discharge Summary  Anticipated Discharge Disposition (PT): home  Reason for  Discharge: At baseline function    Mara Daily, PT  2/4/2025

## 2025-02-05 ENCOUNTER — READMISSION MANAGEMENT (OUTPATIENT)
Dept: CALL CENTER | Facility: HOSPITAL | Age: OVER 89
End: 2025-02-05
Payer: MEDICARE

## 2025-02-05 LAB
QT INTERVAL: 420 MS
QTC INTERVAL: 422 MS

## 2025-02-05 NOTE — OUTREACH NOTE
Stroke Week 1 Survey      Flowsheet Row Responses   [facility] patient discharged from? [place]   Does the patient have one of the following disease processes/diagnoses(primary or secondary)? Stroke   Week 1 attempt successful? Yes   Call start time 1119   Call end time 1134   Discharge diagnosis TIA (transient ischemic attack   Is patient permission given to speak with other caregiver? Yes   List who call center can speak with Velia-wife   Person spoke with today (if not patient) and relationship wife and pt   Meds reviewed with patient/caregiver? Yes   Is the patient having any side effects they believe may be caused by any medication additions or changes? No   Does the patient have all medications ordered at discharge? Yes   Is the patient taking all medications as directed (includes completed medication regime)? Yes   Does the patient have a primary care provider?  Yes   Does the patient have an appointment with their PCP within 7 days of discharge? Yes   Has the patient kept scheduled appointments due by today? N/A   The Stroke Clinic at [facility] requests you follow up with them within 30 days for important follow up care. Please call [phone] to schedule this appointment. Thank you. Yes  [3-12-25 at 1:30pm]   Does the patient require any assistance with activities of daily living such as eating, bathing, dressing, walking, etc.? No   Does the patient have any residual symptoms from stroke/TIA? Yes   Residual symptoms comments Left hand numbness remains, specifically outer index finger and thumb, pt reports. Pt is able to use the hand, he reports. Left side of face is WNL.Pt denies issues with po intake. Left upper lip still feels slightly numb but it does not affect his speech or eating ability, he reports   Comments Pt is wearing telemetry monitor for 30days, per wife's report. Pt uses walking stick outside of house, inside he is independent, wife reports that the pt's baseline  is having a slight walking shuffle.   Did the patient receive a copy of their discharge instructions? Yes   Nursing interventions Reviewed instructions with patient   What is the patient's perception of their health status since discharge? Returned to baseline/stable   Nursing interventions Nurse provided patient education   Is the patient/caregiver able to teach back the risk factors for a stroke? High blood pressure-goal below 120/80   Is the patient/caregiver able to teach back signs and symptoms related to disease process for when to call PCP? Yes   Is the patient/caregiver able to teach back signs and symptoms related to disease process for when to call 911? Yes   If the patient is a current smoker, are they able to teach back resources for cessation? Not a smoker   Is the patient/caregiver able to teach back the hierarchy of who to call/visit for symptoms/problems? PCP, Specialist, Home health nurse, Urgent Care, ED, 911 Yes   Is the patient able to teach back FAST for Stroke? B alance: Watch for sudden loss of balance, E yes: Check for vision loss, F ace: Look for an uneven smile, A rm: Check if one arm is weak, S peech: Listen for slurred speech, T vasu: Call 9-1-1 right away   Week 1 call completed? Yes   Revoked No further contact(revokes)-requires comment   Call end time 1136            Mckenna BUTCHER - Registered Nurse

## 2025-02-05 NOTE — OUTREACH NOTE
Prep Survey      Flowsheet Row Responses   [place] facility patient discharged from? [place]   Is LACE score < 7 ? Yes   Eligibility Readm Mgmt   Discharge diagnosis TIA (transient ischemic attack   Does the patient have one of the following disease processes/diagnoses(primary or secondary)? Stroke   Does the patient have Home health ordered? No   Is there a DME ordered? No   Prep survey completed? Yes            ARABELLA MORE - Registered Nurse

## 2025-03-11 ENCOUNTER — OFFICE VISIT (OUTPATIENT)
Dept: NEUROLOGY | Facility: CLINIC | Age: OVER 89
End: 2025-03-11
Payer: MEDICARE

## 2025-03-11 VITALS
DIASTOLIC BLOOD PRESSURE: 54 MMHG | WEIGHT: 193 LBS | TEMPERATURE: 97.5 F | OXYGEN SATURATION: 99 % | BODY MASS INDEX: 25.58 KG/M2 | HEART RATE: 50 BPM | HEIGHT: 73 IN | SYSTOLIC BLOOD PRESSURE: 118 MMHG

## 2025-03-11 DIAGNOSIS — G45.9 TIA (TRANSIENT ISCHEMIC ATTACK): Primary | ICD-10-CM

## 2025-03-11 RX ORDER — HYDRALAZINE HYDROCHLORIDE 10 MG/1
TABLET, FILM COATED ORAL
COMMUNITY
Start: 2025-02-18

## 2025-03-11 NOTE — PROGRESS NOTES
New Patient Office Visit      Encounter Date: 2025   Patient Name: Juancho Smith Jr.  : 1933   MRN: [id number]   PCP: Rolando Saavedra MD    Referring Provider: ALBIN Benton     Chief Complaint:    Chief Complaint   Patient presents with    Follow-up       History of Present Illness: Juancho Smith Jr. is a 89 y.o. male who is here today to establish care.  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  He presented to [facility] ED on 2025 with concerns for left face and left hand paresthesias.  CT of the head was negative for acute intracranial abnormality.  CTA of the head and neck reveals diffuse iCAD including multifocal high-grade stenoses in the right MCA territory.  NIH improved to 0.  Patient was diagnosed with a TIA likely secondary to iCAD.  He was recommended to complete dual antiplatelet therapy with aspirin and Plavix for 21 days followed by aspirin 81 mg monotherapy as well as high intensity statin.  Patient completed a Holter monitor which was unrevealing for atrial fibrillation.  Of note his STOP-BANG score is 4 which is suggestive of obstructive sleep apnea.  I have encouraged him to be evaluated and wear a CPAP.  He is accompanied to clinic today by a family member.  He is walking with a cane.  He is actively working with PT and OT.  He has been taking his medications without issues or side effects.    Stroke Risk Factors: carotid stenosis, diabetes mellitus, hyperlipidemia, and hypertension      Subjective      Review of Systems:   Review of Systems   Constitutional:  Positive for fatigue. Negative for activity change, appetite change, chills, diaphoresis, fever, unexpected weight gain and unexpected weight loss.   Eyes:  Positive for visual disturbance. Negative for blurred vision, double vision and photophobia.   Respiratory:  Negative for cough and shortness of breath.     Cardiovascular:  Negative for chest pain, palpitations and leg swelling.   Gastrointestinal:  Negative for blood in stool, nausea and vomiting.   Genitourinary:  Negative for hematuria.   Musculoskeletal:  Positive for gait problem.   Neurological:  Negative for dizziness, tremors, seizures, syncope, facial asymmetry, speech difficulty, weakness, light-headedness, numbness, headache, memory problem and confusion.       Past Medical History:   Past Medical History:   Diagnosis Date    Disease of thyroid gland     Glaucoma     Prostate cancer        Past Surgical History:   Past Surgical History:   Procedure Laterality Date    CATARACT EXTRACTION      CHOLECYSTECTOMY      COLONOSCOPY      last one at 82    CYBERKNIFE  2022    prostate/SV    PARATHYROID GLAND SURGERY      PROSTATE BIOPSY      PROSTATE FIDUCIAL MARKER PLACEMENT  2022    THYROID SURGERY      TONSILLECTOMY         Family History:   Family History   Problem Relation Age of Onset    Pancreatitis Mother     Lung cancer Mother     No Known Problems Father     Cancer Sister     Prader-Willi syndrome Sister        Social History:   Social History     Socioeconomic History    Marital status:    Tobacco Use    Smoking status: Former     Current packs/day: 0.00     Types: Cigarettes     Quit date:      Years since quittin.2     Passive exposure: Past    Smokeless tobacco: Never   Vaping Use    Vaping status: Never Used   Substance and Sexual Activity    Alcohol use: Yes     Comment: 5 per week wine or bourbon    Drug use: Never    Sexual activity: Defer       Medications:     Current Outpatient Medications:     acetaminophen (TYLENOL) 500 MG tablet, Take 1 tablet by mouth Every 6 (Six) Hours As Needed for Mild Pain. OTC, Disp: , Rfl:     aspirin 81 MG chewable tablet, Chew 1 tablet Daily., Disp: 30 tablet, Rfl: 0    atorvastatin (LIPITOR) 40 MG tablet, Take 1 tablet by mouth Every Night., Disp: 90 tablet, Rfl: 0    brimonidine  "(ALPHAGAN) 0.2 % ophthalmic solution, Administer 1 drop to both eyes 2 (Two) Times a Day., Disp: , Rfl:     cycloSPORINE (Restasis) 0.05 % ophthalmic emulsion, Administer 1 drop to both eyes 2 (Two) Times a Day., Disp: , Rfl:     dorzolamide-timolol (COSOPT) 2-0.5 % ophthalmic solution, Administer 1 drop to both eyes 2 (Two) Times a Day., Disp: , Rfl:     dutasteride (AVODART) 0.5 MG capsule, Take 1 capsule by mouth Daily., Disp: , Rfl:     ferrous sulfate 324 (65 Fe) MG tablet delayed-release EC tablet, Take 1 tablet by mouth Daily With Breakfast., Disp: , Rfl:     hydrALAZINE (APRESOLINE) 10 MG tablet, , Disp: , Rfl:     hydroCHLOROthiazide 25 MG tablet, Take 1 tablet by mouth Daily., Disp: , Rfl:     levothyroxine (SYNTHROID, LEVOTHROID) 137 MCG tablet, Take 1 tablet by mouth Every Night., Disp: , Rfl:     Lumigan 0.01 % ophthalmic drops, Administer 1 drop to both eyes Every Night., Disp: , Rfl:     multivitamin (THERAGRAN) tablet tablet, Take 1 tablet by mouth Daily. OTC, Disp: , Rfl:     omeprazole (priLOSEC) 20 MG capsule, Take 1 capsule by mouth Daily., Disp: , Rfl:     polyethyl glycol-propyl glycol (SYSTANE) 0.4-0.3 % solution ophthalmic solution (artificial tears), Administer 1 drop to both eyes Daily As Needed., Disp: , Rfl:     tamsulosin (FLOMAX) 0.4 MG capsule 24 hr capsule, Take 1 capsule by mouth Daily., Disp: , Rfl:     vitamin D3 125 MCG (5000 UT) capsule capsule, Take 1 capsule by mouth Daily. OTC, Disp: , Rfl:     Allergies:   No Known Allergies    Objective     Physical Exam:  Vital Signs:   Vitals:    03/11/25 1422   BP: 118/54   Pulse: 50   Temp: 97.5 °F (36.4 °C)   SpO2: 99%   Weight: 87.5 kg (193 lb)   Height: 185.4 cm (72.99\")     Body mass index is 25.47 kg/m².     Physical Exam  Vitals and nursing note reviewed.   Constitutional:       General: He is awake. He is not in acute distress.     Appearance: Normal appearance. He is normal weight. He is not ill-appearing.      Comments: " 89-year-old  male   HENT:      Head: Normocephalic and atraumatic.      Nose: Nose normal.      Mouth/Throat:      Mouth: Mucous membranes are moist.   Eyes:      General: Lids are normal.      Extraocular Movements: Extraocular movements intact.      Pupils: Pupils are equal, round, and reactive to light.   Cardiovascular:      Rate and Rhythm: Normal rate and regular rhythm.      Pulses: Normal pulses.   Pulmonary:      Effort: Pulmonary effort is normal. No respiratory distress.   Skin:     General: Skin is warm and dry.   Neurological:      Mental Status: He is alert and oriented to person, place, and time.      Cranial Nerves: Cranial nerve deficit present.      Sensory: No sensory deficit.      Motor: Motor strength is normal.No weakness.      Coordination: Coordination is intact. Coordination normal.      Gait: Gait abnormal.      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         Speech: Speech normal.         Behavior: Behavior normal.       Neurological Exam  Mental Status  Awake and alert. Oriented to person, place, time and situation. Oriented to person, place, and time. Speech is normal. Language is fluent with no aphasia. Attention and concentration are normal. Fund of knowledge is appropriate for level of education.    Cranial Nerves  CN II: Visual acuity is normal. Visual fields full to confrontation. Chronic left lower quadrant visual field deficit.  CN III, IV, VI: Extraocular movements intact bilaterally. Normal lids and orbits bilaterally. Pupils equal round and reactive to light bilaterally.  CN V: Facial sensation is normal.  CN VII: Full and symmetric facial movement.  CN VIII: Hearing is normal to speech .  CN XI: Shoulder shrug strength is normal.  CN XII: Tongue midline without atrophy or fasciculations.    Motor  Normal muscle bulk throughout. No fasciculations present. Normal muscle tone. Strength is 5/5  throughout all four extremities.    Sensory  Light touch is normal in upper and lower extremities. Pinprick is normal in upper and lower extremities.     Coordination    Finger-to-nose, rapid alternating movements and heel-to-shin normal bilaterally without dysmetria.  No obvious dysmetria .    Gait   Abnormal gait.Casual gait:  Walks with a cane.       Modified Milly Score: 2        0  No Symptoms    1 No significant disability. Able to carry out all usual activities, despite some symptoms.    2 Slight disability. Able to look after own affairs without assistance, but unable to carry out all previous activities.    3 Moderate disability. Requires some help, but able to walk unassisted.    4 Moderately severe disability. Unable to attend to own bodily needs without assistance, and unable to walk unassisted.    5 Severe disability. Requires constant nursing care and attention, bedridden, incontinent.    6 Dead       PHQ-9 Depression Screening  Little interest or pleasure in doing things? Not at all   Feeling down, depressed, or hopeless? Not at all   PHQ-2 Total Score 0   Trouble falling or staying asleep, or sleeping too much?     Feeling tired or having little energy?     Poor appetite or overeating?     Feeling bad about yourself - or that you are a failure or have let yourself or your family down?     Trouble concentrating on things, such as reading the newspaper or watching television?     Moving or speaking so slowly that other people could have noticed? Or the opposite - being so fidgety or restless that you have been moving around a lot more than usual?       Thoughts that you would be better off dead, or of hurting yourself in some way?     PHQ-9 Total Score     If you checked off any problems, how difficult have these problems made it for you to do your work, take care of things at home, or get along with other people?              STOP-Bang Score  Have you been diagnosed with Sleep Apnea?: no  Snoring?:  "yes  Tired?: no  Observed?: no  Pressure?: yes  Stop Score: 2  Body Mass Index more than 35 kg/m2?: no  Age older than 50 year old?: yes  Neck large? \">17\"/43cm-M, >16\"/41cm-F: no  Gender=Male?: yes  Total Stop-Bang Score: 4       STEADI Fall Risk Clinician Key Questions   Have you fallen in the past year?: Yes      Imaging Reviewed:   MRI Cervical Spine With & Without Contrast  Result Date: 2/3/2025  Impression: 1.Moderate cervical spondylosis with varying degrees of neuroforaminal narrowing and mild narrowing of the spinal canal at multiple levels. 2.No acute osseous abnormality. 3.Heterogeneous nodular right thyroid lobe. This could be further evaluated with ultrasound. Electronically Signed: Rolando Flores MD  2/3/2025 6:26 AM EST  Workstation ID: [id number]    MRI Brain Without Contrast  Result Date: 2/3/2025  Impression: 1.No acute intracranial abnormality. 2.Mild chronic small vessel ischemic change. Electronically Signed: Rolando Flores MD  2/3/2025 5:51 AM EST  Workstation ID: [id number]    CT Angiogram Head w AI Analysis of LVO  Result Date: 2/3/2025  Impression: 1.No evidence of large vessel occlusion. 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 A moderate grade stenosis in a left M2 branch. There are high-grade stenoses in the distal PCA branches bilaterally. A focal high-grade stenosis in the pericallosal right LISA. 3.There is moderate focal stenosis at the right vertebral artery origin and there is moderate focal stenosis in the mid right V4 segment. 4.Mild nonstenosing plaque in the carotid arteries. 5.Cervical spondylosis. Electronically Signed: Rolando Flores MD  2/3/2025 12:42 AM EST  Workstation ID: [id number]    CT Angiogram Neck  Result Date: 2/3/2025  Impression: 1.No evidence of large vessel occlusion. 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 A moderate grade stenosis in a left M2 branch. There are high-grade stenoses in the distal PCA branches " bilaterally. A focal high-grade stenosis in the pericallosal right LISA. 3.There is moderate focal stenosis at the right vertebral artery origin and there is moderate focal stenosis in the mid right V4 segment. 4.Mild nonstenosing plaque in the carotid arteries. 5.Cervical spondylosis. Electronically Signed: Rolando Flores MD  2/3/2025 12:42 AM EST  Workstation ID: [id number]    CT CEREBRAL PERFUSION WITH & WITHOUT CONTRAST  Result Date: 2/3/2025  Impression: No significant perfusion abnormality in the brain. Electronically Signed: Rolando Flores MD  2/3/2025 12:30 AM EST  Workstation ID: [id number]    CT Head Without Contrast Stroke Protocol  Result Date: 2/3/2025  Impression: 1.No acute intracranial abnormality. 2.Mild chronic small vessel ischemic change. Electronically Signed: Rolando Flores MD  2/3/2025 12:08 AM EST  Workstation ID: [id number]    XR Chest 1 View  Result Date: 2/2/2025  Impression: No acute cardiopulmonary abnormality. Electronically Signed: Rolando Flores MD  2/2/2025 10:47 PM EST  Workstation ID: [id number]       Laboratory Results:   Hemoglobin   Date Value Ref Range Status   02/03/2025 13.2 13.0 - 17.7 g/dL Final     Hematocrit   Date Value Ref Range Status   02/03/2025 40.3 37.5 - 51.0 % Final     Platelets   Date Value Ref Range Status   02/03/2025 261 140 - 450 10*3/mm3 Final     Hemoglobin A1C   Date Value Ref Range Status   02/03/2025 5.40 4.80 - 5.60 % Final     LDL Cholesterol    Date Value Ref Range Status   02/03/2025 80 0 - 100 mg/dL Final     AST (SGOT)   Date Value Ref Range Status   02/02/2025 23 1 - 40 U/L Final     ALT (SGPT)   Date Value Ref Range Status   02/02/2025 21 1 - 41 U/L Final     Comment:     Specimen hemolyzed.  Result may  be falsely elevated.             Assessment / Plan      Assessment/Plan:   #History of TIA   #HTN  #HLD  -Recommend continuing aspirin 81 and Plavix 75 mg for 21 days followed by aspirin 81 mg monotherapy. Plavix discontinued.  -Continue ASA 81 mg daily  indefinitelyly   -Continue atorvastatin 40 mg daily, (LDL is 80, goal <70)  -TTE shows EF of 60%, no bubble study performed due to age  -30 day cardiac monitor negative for AFib  -Normal blood pressure parameters, <140/90  -Recommend AKIL eval.  Stop Bang 4. Patient will consider.   -Stroke follow-up in clinic in 6 months      Discussed the importance of medication compliance and lifestyle modifications (adequate blood pressure control, adequate control of hyperlipidemia, adequate glycemic control, increase physical activity, and healthy diet) to help reduce the risk of future cerebrovascular events.  Also discussed the signs symptoms that would warrant the patient return back to the emergency department including unilateral weakness, unilateral numbness, visual disturbances, loss of balance, speech difficulties, and/or a sudden severe headache.      Follow Up:   Return in about 6 months (around 9/11/2025).    ALBIN Tamayo  [facility]

## 2025-04-10 ENCOUNTER — OFFICE VISIT (OUTPATIENT)
Dept: CARDIOLOGY | Facility: HOSPITAL | Age: OVER 89
End: 2025-04-10
Payer: MEDICARE

## 2025-04-10 VITALS
WEIGHT: 198.4 LBS | SYSTOLIC BLOOD PRESSURE: 157 MMHG | HEIGHT: 73 IN | BODY MASS INDEX: 26.29 KG/M2 | RESPIRATION RATE: 18 BRPM | HEART RATE: 56 BPM | OXYGEN SATURATION: 96 % | DIASTOLIC BLOOD PRESSURE: 68 MMHG

## 2025-04-10 DIAGNOSIS — I34.0 NONRHEUMATIC MITRAL VALVE REGURGITATION: Primary | ICD-10-CM

## 2025-04-10 NOTE — PROGRESS NOTES
"Chief Complaint  Transient Ischemic Attack    Subjective      History of Present Illness {  Problem List  Visit  Diagnosis   Encounters  Notes  Medications  Labs  Result Review Imaging  Media :23}     Juancho Smith Jr., 89 y.o. 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    Patient recently hospitalized at [facility] with concern for left face and left hand paresthesias. 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 He was followed by neurology and suspected of TIA.  TTE during hospitalization with preserved LVEF, bubble study deferred due to age.  Cardiac monitor completed with approximately 30 days of analysis; no significant arrhythmia, no AF reported on cardiac monitor.  Patient completed follow-up with stroke neurology with thought etiology of TIA cerebral atherosclerotic disease.    Patient presents today feeling well overall from a cardiac standpoint.  Reports blood pressure fluctuating but SBP generally 130-140s on home monitoring.  Overall without cardiovascular symptoms; denies palpitations/tachypalpitation, dyspnea, chest pain.      Objective     Vital Signs:   Vitals:    04/10/25 1008   BP: 157/68   BP Location: Right arm   Patient Position: Sitting   Cuff Size: Adult   Pulse: 56   Resp: 18   SpO2: 96%   Weight: 90 kg (198 lb 6.4 oz)   Height: 185.4 cm (73\")     Body mass index is 26.18 kg/m².  Physical Exam  Vitals and nursing note reviewed.   Constitutional:       Appearance: Normal appearance.   HENT:      Head: Normocephalic.   Eyes:      Extraocular Movements: Extraocular movements intact.   Neck:      Vascular: No carotid bruit.   Cardiovascular:      Rate and Rhythm: Normal rate and regular rhythm.      Pulses: Normal pulses.      Heart sounds: Normal heart sounds, S1 normal and S2 normal. No murmur " heard.  Pulmonary:      Effort: Pulmonary effort is normal. No respiratory distress.      Breath sounds: Normal breath sounds.   Musculoskeletal:      Cervical back: Neck supple.      Right lower leg: No edema.      Left lower leg: No edema.   Skin:     General: Skin is warm and dry.   Neurological:      General: No focal deficit present.      Mental Status: He is alert.   Psychiatric:         Mood and Affect: Mood normal.         Behavior: Behavior normal.         Thought Content: Thought content normal.        Data Reviewed:{ Labs  Result Review  Imaging  Med Tab  Media :23}     ECG 12 Lead Stroke Evaluation (02/02/2025 22:34)  Adult Transthoracic Echo Complete W/ Cont if Necessary Per Protocol (With Agitated Saline) (02/03/2025 10:01)  CBC (No Diff) (02/03/2025 07:52)  Hemoglobin A1c (02/03/2025 07:52)  Basic Metabolic Panel (02/03/2025 07:41)  Lipid Panel (02/03/2025 07:41)      Assessment & Plan   Assessment and Plan {CC Problem List  Visit Diagnosis  ROS  Review (Popup)  Health Maintenance  Quality  BestPractice  Medications  SmartSets  SnapShot Encounters  Media :23}     1. TIA (transient ischemic attack)  -Recently hospitalized at [facility] with concern for left face and left hand paresthesias.   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   -He was followed by neurology and suspected of TIA.  -TIA thought likely secondary to iCAD     -TTE during hospitalization with preserved LVEF, bubble study deferred due to age.    -Cardiac monitor completed with approximately 30 days of analysis; no significant arrhythmia, no AF reported on cardiac monitor.    -Continue ASA, atorvastatin as prescribed  -Continue routine follow-up with stroke neurology as scheduled    2. Essential hypertension  -Reports blood pressures generally controlled on ambulatory monitoring  -Continue HCTZ 25 Mg daily, as needed hydralazine as prescribed  -Discussed continue home blood pressure  monitoring    3.  Mitral regurgitation/aortic insufficiency  - TTE during hospitalization with mild to moderate aortic insufficiency, mild to moderate MR.  - Overall asymptomatic, currently well compensated.  -Recommendation of repeat TTE in approximately 3 years for surveillance  - Discussed options of establishing with cardiology for surveillance versus primary care.  He will discuss with primary care and will contact myself if interested in establishing with cardiology for surveillance.    Follow Up {Instructions Charge Capture  Follow-up Communications :23}     Return if symptoms worsen or fail to improve.      Patient was given instructions and counseling regarding his condition or for health maintenance advice. Please see specific information pulled into the AVS if appropriate. Patient was instructed to call the [facility] with any questions, concerns, or worsening symptoms.    Dictated Utilizing Dragon Dictation   Please note that portions of this note were completed with a voice recognition program. Part of this note may be an electronic transcription/translation of spoken language to printed text using the Dragon Dictation System.